# Patient Record
Sex: FEMALE | Race: WHITE | URBAN - METROPOLITAN AREA
[De-identification: names, ages, dates, MRNs, and addresses within clinical notes are randomized per-mention and may not be internally consistent; named-entity substitution may affect disease eponyms.]

---

## 2017-01-01 ENCOUNTER — APPOINTMENT (OUTPATIENT)
Dept: SPEECH THERAPY | Facility: CLINIC | Age: 62
DRG: 065 | End: 2017-01-01
Attending: INTERNAL MEDICINE

## 2017-01-01 ENCOUNTER — APPOINTMENT (OUTPATIENT)
Dept: CT IMAGING | Facility: CLINIC | Age: 62
DRG: 064 | End: 2017-01-01
Attending: EMERGENCY MEDICINE

## 2017-01-01 ENCOUNTER — APPOINTMENT (OUTPATIENT)
Dept: CT IMAGING | Facility: CLINIC | Age: 62
DRG: 065 | End: 2017-01-01
Attending: EMERGENCY MEDICINE

## 2017-01-01 ENCOUNTER — APPOINTMENT (OUTPATIENT)
Dept: INTERVENTIONAL RADIOLOGY/VASCULAR | Facility: CLINIC | Age: 62
DRG: 064 | End: 2017-01-01
Attending: NURSE PRACTITIONER

## 2017-01-01 ENCOUNTER — HOSPITAL ENCOUNTER (INPATIENT)
Facility: CLINIC | Age: 62
LOS: 1 days | Discharge: HOME | DRG: 065 | End: 2017-08-16
Attending: EMERGENCY MEDICINE | Admitting: INTERNAL MEDICINE

## 2017-01-01 ENCOUNTER — ANESTHESIA EVENT (OUTPATIENT)
Dept: INTERVENTIONAL RADIOLOGY/VASCULAR | Facility: CLINIC | Age: 62
DRG: 064 | End: 2017-01-01

## 2017-01-01 ENCOUNTER — APPOINTMENT (OUTPATIENT)
Dept: OCCUPATIONAL THERAPY | Facility: CLINIC | Age: 62
DRG: 065 | End: 2017-01-01
Attending: INTERNAL MEDICINE

## 2017-01-01 ENCOUNTER — APPOINTMENT (OUTPATIENT)
Dept: MRI IMAGING | Facility: CLINIC | Age: 62
DRG: 064 | End: 2017-01-01
Attending: PSYCHIATRY & NEUROLOGY

## 2017-01-01 ENCOUNTER — ANESTHESIA (OUTPATIENT)
Dept: INTERVENTIONAL RADIOLOGY/VASCULAR | Facility: CLINIC | Age: 62
DRG: 064 | End: 2017-01-01

## 2017-01-01 ENCOUNTER — APPOINTMENT (OUTPATIENT)
Dept: GENERAL RADIOLOGY | Facility: CLINIC | Age: 62
DRG: 064 | End: 2017-01-01
Attending: NURSE PRACTITIONER

## 2017-01-01 ENCOUNTER — APPOINTMENT (OUTPATIENT)
Dept: PHYSICAL THERAPY | Facility: CLINIC | Age: 62
DRG: 065 | End: 2017-01-01
Attending: INTERNAL MEDICINE

## 2017-01-01 ENCOUNTER — APPOINTMENT (OUTPATIENT)
Dept: MRI IMAGING | Facility: CLINIC | Age: 62
DRG: 064 | End: 2017-01-01
Attending: NURSE PRACTITIONER

## 2017-01-01 ENCOUNTER — APPOINTMENT (OUTPATIENT)
Dept: CARDIOLOGY | Facility: CLINIC | Age: 62
DRG: 065 | End: 2017-01-01
Attending: INTERNAL MEDICINE

## 2017-01-01 ENCOUNTER — APPOINTMENT (OUTPATIENT)
Dept: MRI IMAGING | Facility: CLINIC | Age: 62
DRG: 065 | End: 2017-01-01
Attending: INTERNAL MEDICINE

## 2017-01-01 ENCOUNTER — HOSPITAL ENCOUNTER (INPATIENT)
Facility: CLINIC | Age: 62
LOS: 4 days | Discharge: HOSPICE | DRG: 064 | End: 2017-09-03
Attending: EMERGENCY MEDICINE | Admitting: INTERNAL MEDICINE

## 2017-01-01 VITALS
HEART RATE: 86 BPM | OXYGEN SATURATION: 94 % | SYSTOLIC BLOOD PRESSURE: 163 MMHG | BODY MASS INDEX: 30.34 KG/M2 | HEIGHT: 64 IN | TEMPERATURE: 98.9 F | RESPIRATION RATE: 16 BRPM | WEIGHT: 177.69 LBS | DIASTOLIC BLOOD PRESSURE: 92 MMHG

## 2017-01-01 VITALS
WEIGHT: 188.49 LBS | HEART RATE: 92 BPM | OXYGEN SATURATION: 100 % | HEIGHT: 64 IN | RESPIRATION RATE: 20 BRPM | BODY MASS INDEX: 32.18 KG/M2 | SYSTOLIC BLOOD PRESSURE: 125 MMHG | TEMPERATURE: 101.5 F | DIASTOLIC BLOOD PRESSURE: 62 MMHG

## 2017-01-01 DIAGNOSIS — I63.219: ICD-10-CM

## 2017-01-01 DIAGNOSIS — I63.22: ICD-10-CM

## 2017-01-01 DIAGNOSIS — E78.2 MIXED HYPERLIPIDEMIA: ICD-10-CM

## 2017-01-01 DIAGNOSIS — I63.9 CEREBROVASCULAR ACCIDENT (CVA), UNSPECIFIED MECHANISM (H): Primary | ICD-10-CM

## 2017-01-01 DIAGNOSIS — Z72.0 TOBACCO ABUSE: ICD-10-CM

## 2017-01-01 DIAGNOSIS — I63.9 CEREBROVASCULAR ACCIDENT (CVA), UNSPECIFIED MECHANISM (H): ICD-10-CM

## 2017-01-01 DIAGNOSIS — Z86.73 HISTORY OF STROKE: ICD-10-CM

## 2017-01-01 LAB
ALBUMIN SERPL-MCNC: 3 G/DL (ref 3.4–5)
ALP SERPL-CCNC: 51 U/L (ref 40–150)
ALT SERPL W P-5'-P-CCNC: 20 U/L (ref 0–50)
ANION GAP SERPL CALCULATED.3IONS-SCNC: 10 MMOL/L (ref 3–14)
ANION GAP SERPL CALCULATED.3IONS-SCNC: 11 MMOL/L (ref 3–14)
ANION GAP SERPL CALCULATED.3IONS-SCNC: 5 MMOL/L (ref 3–14)
ANION GAP SERPL CALCULATED.3IONS-SCNC: 8 MMOL/L (ref 3–14)
APTT PPP: 32 SEC (ref 22–37)
AST SERPL W P-5'-P-CCNC: 10 U/L (ref 0–45)
BASE DEFICIT BLDA-SCNC: 3.5 MMOL/L
BASOPHILS # BLD AUTO: 0 10E9/L (ref 0–0.2)
BASOPHILS # BLD AUTO: 0 10E9/L (ref 0–0.2)
BASOPHILS NFR BLD AUTO: 0.3 %
BASOPHILS NFR BLD AUTO: 0.3 %
BILIRUB DIRECT SERPL-MCNC: 0.2 MG/DL (ref 0–0.2)
BILIRUB SERPL-MCNC: 0.9 MG/DL (ref 0.2–1.3)
BUN SERPL-MCNC: 10 MG/DL (ref 7–30)
BUN SERPL-MCNC: 12 MG/DL (ref 7–30)
BUN SERPL-MCNC: 7 MG/DL (ref 7–30)
BUN SERPL-MCNC: 9 MG/DL (ref 7–30)
CALCIUM SERPL-MCNC: 8.1 MG/DL (ref 8.5–10.1)
CALCIUM SERPL-MCNC: 8.4 MG/DL (ref 8.5–10.1)
CALCIUM SERPL-MCNC: 9.4 MG/DL (ref 8.5–10.1)
CALCIUM SERPL-MCNC: 9.4 MG/DL (ref 8.5–10.1)
CHLORIDE SERPL-SCNC: 103 MMOL/L (ref 94–109)
CHLORIDE SERPL-SCNC: 107 MMOL/L (ref 94–109)
CHLORIDE SERPL-SCNC: 109 MMOL/L (ref 94–109)
CHLORIDE SERPL-SCNC: 111 MMOL/L (ref 94–109)
CHOLEST SERPL-MCNC: 242 MG/DL
CO2 SERPL-SCNC: 23 MMOL/L (ref 20–32)
CO2 SERPL-SCNC: 24 MMOL/L (ref 20–32)
CO2 SERPL-SCNC: 24 MMOL/L (ref 20–32)
CO2 SERPL-SCNC: 27 MMOL/L (ref 20–32)
CREAT SERPL-MCNC: 0.67 MG/DL (ref 0.52–1.04)
CREAT SERPL-MCNC: 0.76 MG/DL (ref 0.52–1.04)
CREAT SERPL-MCNC: 0.78 MG/DL (ref 0.52–1.04)
CREAT SERPL-MCNC: 0.8 MG/DL (ref 0.52–1.04)
DEPRECATED CALCIDIOL+CALCIFEROL SERPL-MC: 17 UG/L (ref 20–75)
DEPRECATED CALCIDIOL+CALCIFEROL SERPL-MC: 21 UG/L (ref 20–75)
DIFFERENTIAL METHOD BLD: ABNORMAL
DIFFERENTIAL METHOD BLD: ABNORMAL
EOSINOPHIL # BLD AUTO: 0.1 10E9/L (ref 0–0.7)
EOSINOPHIL # BLD AUTO: 0.2 10E9/L (ref 0–0.7)
EOSINOPHIL NFR BLD AUTO: 1.1 %
EOSINOPHIL NFR BLD AUTO: 1.4 %
ERYTHROCYTE [DISTWIDTH] IN BLOOD BY AUTOMATED COUNT: 12.7 % (ref 10–15)
ERYTHROCYTE [DISTWIDTH] IN BLOOD BY AUTOMATED COUNT: 12.8 % (ref 10–15)
ERYTHROCYTE [DISTWIDTH] IN BLOOD BY AUTOMATED COUNT: 12.9 % (ref 10–15)
ERYTHROCYTE [DISTWIDTH] IN BLOOD BY AUTOMATED COUNT: 13.7 % (ref 10–15)
ERYTHROCYTE [DISTWIDTH] IN BLOOD BY AUTOMATED COUNT: 13.7 % (ref 10–15)
GFR SERPL CREATININE-BSD FRML MDRD: 73 ML/MIN/1.7M2
GFR SERPL CREATININE-BSD FRML MDRD: 75 ML/MIN/1.7M2
GFR SERPL CREATININE-BSD FRML MDRD: 78 ML/MIN/1.7M2
GFR SERPL CREATININE-BSD FRML MDRD: 89 ML/MIN/1.7M2
GLUCOSE BLDC GLUCOMTR-MCNC: 106 MG/DL (ref 70–99)
GLUCOSE BLDC GLUCOMTR-MCNC: 110 MG/DL (ref 70–99)
GLUCOSE BLDC GLUCOMTR-MCNC: 111 MG/DL (ref 70–99)
GLUCOSE BLDC GLUCOMTR-MCNC: 117 MG/DL (ref 70–99)
GLUCOSE BLDC GLUCOMTR-MCNC: 95 MG/DL (ref 70–99)
GLUCOSE BLDC GLUCOMTR-MCNC: 97 MG/DL (ref 70–99)
GLUCOSE BLDC GLUCOMTR-MCNC: 99 MG/DL (ref 70–99)
GLUCOSE SERPL-MCNC: 104 MG/DL (ref 70–99)
GLUCOSE SERPL-MCNC: 106 MG/DL (ref 70–99)
GLUCOSE SERPL-MCNC: 115 MG/DL (ref 70–99)
GLUCOSE SERPL-MCNC: 95 MG/DL (ref 70–99)
HBA1C MFR BLD: 5.2 % (ref 4.3–6)
HCO3 BLD-SCNC: 21 MMOL/L (ref 21–28)
HCT VFR BLD AUTO: 33.9 % (ref 35–47)
HCT VFR BLD AUTO: 39.2 % (ref 35–47)
HCT VFR BLD AUTO: 42.7 % (ref 35–47)
HCT VFR BLD AUTO: 45.1 % (ref 35–47)
HCT VFR BLD AUTO: 46.2 % (ref 35–47)
HDLC SERPL-MCNC: 78 MG/DL
HGB BLD-MCNC: 11.4 G/DL (ref 11.7–15.7)
HGB BLD-MCNC: 13.5 G/DL (ref 11.7–15.7)
HGB BLD-MCNC: 14.4 G/DL (ref 11.7–15.7)
HGB BLD-MCNC: 15.3 G/DL (ref 11.7–15.7)
HGB BLD-MCNC: 16.1 G/DL (ref 11.7–15.7)
IMM GRANULOCYTES # BLD: 0 10E9/L (ref 0–0.4)
IMM GRANULOCYTES # BLD: 0 10E9/L (ref 0–0.4)
IMM GRANULOCYTES NFR BLD: 0.2 %
IMM GRANULOCYTES NFR BLD: 0.3 %
INR PPP: 0.92 (ref 0.86–1.14)
INR PPP: 0.96 (ref 0.86–1.14)
INTERPRETATION ECG - MUSE: NORMAL
INTERPRETATION ECG - MUSE: NORMAL
LDLC SERPL CALC-MCNC: 127 MG/DL
LMWH PPP CHRO-ACNC: 0.13 IU/ML
LMWH PPP CHRO-ACNC: 0.26 IU/ML
LMWH PPP CHRO-ACNC: 0.38 IU/ML
LMWH PPP CHRO-ACNC: 0.4 IU/ML
LMWH PPP CHRO-ACNC: <0.1 IU/ML
LYMPHOCYTES # BLD AUTO: 1.8 10E9/L (ref 0.8–5.3)
LYMPHOCYTES # BLD AUTO: 3.2 10E9/L (ref 0.8–5.3)
LYMPHOCYTES NFR BLD AUTO: 15 %
LYMPHOCYTES NFR BLD AUTO: 30.7 %
MAGNESIUM SERPL-MCNC: 1.8 MG/DL (ref 1.6–2.3)
MCH RBC QN AUTO: 33.6 PG (ref 26.5–33)
MCH RBC QN AUTO: 34 PG (ref 26.5–33)
MCH RBC QN AUTO: 34.2 PG (ref 26.5–33)
MCH RBC QN AUTO: 34.5 PG (ref 26.5–33)
MCH RBC QN AUTO: 34.9 PG (ref 26.5–33)
MCHC RBC AUTO-ENTMCNC: 33.6 G/DL (ref 31.5–36.5)
MCHC RBC AUTO-ENTMCNC: 33.7 G/DL (ref 31.5–36.5)
MCHC RBC AUTO-ENTMCNC: 33.9 G/DL (ref 31.5–36.5)
MCHC RBC AUTO-ENTMCNC: 34.4 G/DL (ref 31.5–36.5)
MCHC RBC AUTO-ENTMCNC: 34.8 G/DL (ref 31.5–36.5)
MCV RBC AUTO: 100 FL (ref 78–100)
MCV RBC AUTO: 101 FL (ref 78–100)
MCV RBC AUTO: 101 FL (ref 78–100)
MONOCYTES # BLD AUTO: 0.8 10E9/L (ref 0–1.3)
MONOCYTES # BLD AUTO: 0.9 10E9/L (ref 0–1.3)
MONOCYTES NFR BLD AUTO: 7.5 %
MONOCYTES NFR BLD AUTO: 7.8 %
MRSA DNA SPEC QL NAA+PROBE: NEGATIVE
NEUTROPHILS # BLD AUTO: 6.3 10E9/L (ref 1.6–8.3)
NEUTROPHILS # BLD AUTO: 8.9 10E9/L (ref 1.6–8.3)
NEUTROPHILS NFR BLD AUTO: 59.8 %
NEUTROPHILS NFR BLD AUTO: 75.6 %
NONHDLC SERPL-MCNC: 164 MG/DL
NRBC # BLD AUTO: 0 10*3/UL
NRBC # BLD AUTO: 0 10*3/UL
NRBC BLD AUTO-RTO: 0 /100
NRBC BLD AUTO-RTO: 0 /100
O2/TOTAL GAS SETTING VFR VENT: ABNORMAL %
OXYHGB MFR BLD: 99 % (ref 92–100)
PCO2 BLD: 37 MM HG (ref 35–45)
PH BLD: 7.37 PH (ref 7.35–7.45)
PLATELET # BLD AUTO: 229 10E9/L (ref 150–450)
PLATELET # BLD AUTO: 254 10E9/L (ref 150–450)
PLATELET # BLD AUTO: 271 10E9/L (ref 150–450)
PLATELET # BLD AUTO: 298 10E9/L (ref 150–450)
PLATELET # BLD AUTO: 320 10E9/L (ref 150–450)
PO2 BLD: 156 MM HG (ref 80–105)
POTASSIUM SERPL-SCNC: 3.6 MMOL/L (ref 3.4–5.3)
POTASSIUM SERPL-SCNC: 3.8 MMOL/L (ref 3.4–5.3)
POTASSIUM SERPL-SCNC: 4 MMOL/L (ref 3.4–5.3)
POTASSIUM SERPL-SCNC: 4.1 MMOL/L (ref 3.4–5.3)
PROT SERPL-MCNC: 6.1 G/DL (ref 6.8–8.8)
RBC # BLD AUTO: 3.39 10E12/L (ref 3.8–5.2)
RBC # BLD AUTO: 3.91 10E12/L (ref 3.8–5.2)
RBC # BLD AUTO: 4.24 10E12/L (ref 3.8–5.2)
RBC # BLD AUTO: 4.48 10E12/L (ref 3.8–5.2)
RBC # BLD AUTO: 4.61 10E12/L (ref 3.8–5.2)
SODIUM SERPL-SCNC: 138 MMOL/L (ref 133–144)
SODIUM SERPL-SCNC: 139 MMOL/L (ref 133–144)
SODIUM SERPL-SCNC: 142 MMOL/L (ref 133–144)
SODIUM SERPL-SCNC: 143 MMOL/L (ref 133–144)
SPECIMEN SOURCE: NORMAL
TRIGL SERPL-MCNC: 186 MG/DL
TROPONIN I SERPL-MCNC: 0.04 UG/L (ref 0–0.04)
TROPONIN I SERPL-MCNC: 0.07 UG/L (ref 0–0.04)
TROPONIN I SERPL-MCNC: <0.015 UG/L (ref 0–0.04)
TSH SERPL DL<=0.005 MIU/L-ACNC: 1.69 MU/L (ref 0.4–4)
WBC # BLD AUTO: 10.5 10E9/L (ref 4–11)
WBC # BLD AUTO: 11.3 10E9/L (ref 4–11)
WBC # BLD AUTO: 11.8 10E9/L (ref 4–11)
WBC # BLD AUTO: 17 10E9/L (ref 4–11)
WBC # BLD AUTO: 8.6 10E9/L (ref 4–11)

## 2017-01-01 PROCEDURE — 27210738 ZZH ACCESSORY CR2

## 2017-01-01 PROCEDURE — 94003 VENT MGMT INPAT SUBQ DAY: CPT

## 2017-01-01 PROCEDURE — 70498 CT ANGIOGRAPHY NECK: CPT

## 2017-01-01 PROCEDURE — 25000128 H RX IP 250 OP 636: Performed by: HOSPITALIST

## 2017-01-01 PROCEDURE — 85730 THROMBOPLASTIN TIME PARTIAL: CPT | Performed by: EMERGENCY MEDICINE

## 2017-01-01 PROCEDURE — 27211141 ZZHC CATH NEURO CR18

## 2017-01-01 PROCEDURE — 40000008 ZZH STATISTIC AIRWAY CARE

## 2017-01-01 PROCEDURE — C1769 GUIDE WIRE: HCPCS

## 2017-01-01 PROCEDURE — 40000133 ZZH STATISTIC OT WARD VISIT

## 2017-01-01 PROCEDURE — 25000132 ZZH RX MED GY IP 250 OP 250 PS 637: Performed by: NURSE PRACTITIONER

## 2017-01-01 PROCEDURE — 40000986 XR CHEST PORT 1 VW

## 2017-01-01 PROCEDURE — 99223 1ST HOSP IP/OBS HIGH 75: CPT | Mod: AI | Performed by: PHYSICIAN ASSISTANT

## 2017-01-01 PROCEDURE — 36415 COLL VENOUS BLD VENIPUNCTURE: CPT | Performed by: PSYCHIATRY & NEUROLOGY

## 2017-01-01 PROCEDURE — 36226 PLACE CATH VERTEBRAL ART: CPT

## 2017-01-01 PROCEDURE — 80048 BASIC METABOLIC PNL TOTAL CA: CPT | Performed by: EMERGENCY MEDICINE

## 2017-01-01 PROCEDURE — C1887 CATHETER, GUIDING: HCPCS

## 2017-01-01 PROCEDURE — 36415 COLL VENOUS BLD VENIPUNCTURE: CPT | Performed by: PHYSICIAN ASSISTANT

## 2017-01-01 PROCEDURE — 40000193 ZZH STATISTIC PT WARD VISIT: Performed by: PHYSICAL THERAPIST

## 2017-01-01 PROCEDURE — 70551 MRI BRAIN STEM W/O DYE: CPT

## 2017-01-01 PROCEDURE — 25000128 H RX IP 250 OP 636: Performed by: EMERGENCY MEDICINE

## 2017-01-01 PROCEDURE — 36415 COLL VENOUS BLD VENIPUNCTURE: CPT | Performed by: NURSE PRACTITIONER

## 2017-01-01 PROCEDURE — 25000132 ZZH RX MED GY IP 250 OP 250 PS 637: Performed by: PHYSICIAN ASSISTANT

## 2017-01-01 PROCEDURE — 82306 VITAMIN D 25 HYDROXY: CPT | Performed by: PHYSICIAN ASSISTANT

## 2017-01-01 PROCEDURE — 36415 COLL VENOUS BLD VENIPUNCTURE: CPT | Performed by: INTERNAL MEDICINE

## 2017-01-01 PROCEDURE — 84484 ASSAY OF TROPONIN QUANT: CPT | Performed by: INTERNAL MEDICINE

## 2017-01-01 PROCEDURE — 40000264 ECHO COMPLETE BUBBLE STUDY WITH OPTISON

## 2017-01-01 PROCEDURE — 25000132 ZZH RX MED GY IP 250 OP 250 PS 637: Performed by: INTERNAL MEDICINE

## 2017-01-01 PROCEDURE — 25000132 ZZH RX MED GY IP 250 OP 250 PS 637: Performed by: HOSPITALIST

## 2017-01-01 PROCEDURE — 85025 COMPLETE CBC W/AUTO DIFF WBC: CPT | Performed by: EMERGENCY MEDICINE

## 2017-01-01 PROCEDURE — 25000125 ZZHC RX 250: Performed by: NURSE PRACTITIONER

## 2017-01-01 PROCEDURE — S0028 INJECTION, FAMOTIDINE, 20 MG: HCPCS | Performed by: NURSE PRACTITIONER

## 2017-01-01 PROCEDURE — B3151ZZ FLUOROSCOPY OF BILATERAL COMMON CAROTID ARTERIES USING LOW OSMOLAR CONTRAST: ICD-10-PCS | Performed by: RADIOLOGY

## 2017-01-01 PROCEDURE — 27211192 ZZ H SHEATH CR14

## 2017-01-01 PROCEDURE — 92526 ORAL FUNCTION THERAPY: CPT | Mod: GN | Performed by: SPEECH-LANGUAGE PATHOLOGIST

## 2017-01-01 PROCEDURE — 20000003 ZZH R&B ICU

## 2017-01-01 PROCEDURE — 25000128 H RX IP 250 OP 636: Performed by: NURSE PRACTITIONER

## 2017-01-01 PROCEDURE — 85520 HEPARIN ASSAY: CPT | Performed by: PSYCHIATRY & NEUROLOGY

## 2017-01-01 PROCEDURE — 25000128 H RX IP 250 OP 636: Performed by: NURSE ANESTHETIST, CERTIFIED REGISTERED

## 2017-01-01 PROCEDURE — 25000128 H RX IP 250 OP 636: Performed by: INTERNAL MEDICINE

## 2017-01-01 PROCEDURE — 99239 HOSP IP/OBS DSCHRG MGMT >30: CPT | Performed by: HOSPITALIST

## 2017-01-01 PROCEDURE — 25000128 H RX IP 250 OP 636: Performed by: PHYSICIAN ASSISTANT

## 2017-01-01 PROCEDURE — 83735 ASSAY OF MAGNESIUM: CPT | Performed by: INTERNAL MEDICINE

## 2017-01-01 PROCEDURE — 27210737 ZZH ACCESSORY CR14

## 2017-01-01 PROCEDURE — 00000146 ZZHCL STATISTIC GLUCOSE BY METER IP

## 2017-01-01 PROCEDURE — 85610 PROTHROMBIN TIME: CPT | Performed by: EMERGENCY MEDICINE

## 2017-01-01 PROCEDURE — 25000128 H RX IP 250 OP 636

## 2017-01-01 PROCEDURE — 99232 SBSQ HOSP IP/OBS MODERATE 35: CPT | Performed by: HOSPITALIST

## 2017-01-01 PROCEDURE — 80048 BASIC METABOLIC PNL TOTAL CA: CPT | Performed by: INTERNAL MEDICINE

## 2017-01-01 PROCEDURE — 80061 LIPID PANEL: CPT | Performed by: INTERNAL MEDICINE

## 2017-01-01 PROCEDURE — 40000275 ZZH STATISTIC RCP TIME EA 10 MIN

## 2017-01-01 PROCEDURE — 27210732 ZZH ACCESSORY CR1

## 2017-01-01 PROCEDURE — 85027 COMPLETE CBC AUTOMATED: CPT | Performed by: PHYSICIAN ASSISTANT

## 2017-01-01 PROCEDURE — 25000128 H RX IP 250 OP 636: Performed by: PSYCHIATRY & NEUROLOGY

## 2017-01-01 PROCEDURE — 70470 CT HEAD/BRAIN W/O & W/DYE: CPT | Mod: XS

## 2017-01-01 PROCEDURE — 25000125 ZZHC RX 250: Performed by: EMERGENCY MEDICINE

## 2017-01-01 PROCEDURE — 80076 HEPATIC FUNCTION PANEL: CPT | Performed by: INTERNAL MEDICINE

## 2017-01-01 PROCEDURE — 12000000 ZZH R&B MED SURG/OB

## 2017-01-01 PROCEDURE — 82805 BLOOD GASES W/O2 SATURATION: CPT | Performed by: NURSE PRACTITIONER

## 2017-01-01 PROCEDURE — 85027 COMPLETE CBC AUTOMATED: CPT | Performed by: INTERNAL MEDICINE

## 2017-01-01 PROCEDURE — 99406 BEHAV CHNG SMOKING 3-10 MIN: CPT

## 2017-01-01 PROCEDURE — 70460 CT HEAD/BRAIN W/DYE: CPT

## 2017-01-01 PROCEDURE — 93306 TTE W/DOPPLER COMPLETE: CPT | Mod: 26 | Performed by: INTERNAL MEDICINE

## 2017-01-01 PROCEDURE — 97161 PT EVAL LOW COMPLEX 20 MIN: CPT | Mod: GP | Performed by: PHYSICAL THERAPIST

## 2017-01-01 PROCEDURE — 40000281 ZZH STATISTIC TRANSPORT TIME EA 15 MIN

## 2017-01-01 PROCEDURE — 36222 PLACE CATH CAROTID/INOM ART: CPT | Mod: 50

## 2017-01-01 PROCEDURE — 85520 HEPARIN ASSAY: CPT | Performed by: NURSE PRACTITIONER

## 2017-01-01 PROCEDURE — 70551 MRI BRAIN STEM W/O DYE: CPT | Mod: 77

## 2017-01-01 PROCEDURE — 93005 ELECTROCARDIOGRAM TRACING: CPT

## 2017-01-01 PROCEDURE — 94002 VENT MGMT INPAT INIT DAY: CPT

## 2017-01-01 PROCEDURE — 84484 ASSAY OF TROPONIN QUANT: CPT | Performed by: PHYSICIAN ASSISTANT

## 2017-01-01 PROCEDURE — 97165 OT EVAL LOW COMPLEX 30 MIN: CPT | Mod: GO

## 2017-01-01 PROCEDURE — 27210906 ZZH KIT CR8

## 2017-01-01 PROCEDURE — 25000128 H RX IP 250 OP 636: Performed by: RADIOLOGY

## 2017-01-01 PROCEDURE — 92610 EVALUATE SWALLOWING FUNCTION: CPT | Mod: GN | Performed by: SPEECH-LANGUAGE PATHOLOGIST

## 2017-01-01 PROCEDURE — 97116 GAIT TRAINING THERAPY: CPT | Mod: GP | Performed by: PHYSICAL THERAPIST

## 2017-01-01 PROCEDURE — 83036 HEMOGLOBIN GLYCOSYLATED A1C: CPT | Performed by: INTERNAL MEDICINE

## 2017-01-01 PROCEDURE — 27210886 ZZH ACCESSORY CR5

## 2017-01-01 PROCEDURE — 96360 HYDRATION IV INFUSION INIT: CPT | Mod: 59

## 2017-01-01 PROCEDURE — 84443 ASSAY THYROID STIM HORMONE: CPT | Performed by: INTERNAL MEDICINE

## 2017-01-01 PROCEDURE — 70450 CT HEAD/BRAIN W/O DYE: CPT

## 2017-01-01 PROCEDURE — 87641 MR-STAPH DNA AMP PROBE: CPT | Performed by: NURSE PRACTITIONER

## 2017-01-01 PROCEDURE — 36600 WITHDRAWAL OF ARTERIAL BLOOD: CPT

## 2017-01-01 PROCEDURE — 25500064 ZZH RX 255 OP 636: Performed by: INTERNAL MEDICINE

## 2017-01-01 PROCEDURE — 85520 HEPARIN ASSAY: CPT | Performed by: INTERNAL MEDICINE

## 2017-01-01 PROCEDURE — 99222 1ST HOSP IP/OBS MODERATE 55: CPT | Mod: AI | Performed by: INTERNAL MEDICINE

## 2017-01-01 PROCEDURE — B31F1ZZ FLUOROSCOPY OF LEFT VERTEBRAL ARTERY USING LOW OSMOLAR CONTRAST: ICD-10-PCS | Performed by: RADIOLOGY

## 2017-01-01 PROCEDURE — 99285 EMERGENCY DEPT VISIT HI MDM: CPT | Mod: 25

## 2017-01-01 PROCEDURE — 40000225 ZZH STATISTIC SLP WARD VISIT: Performed by: SPEECH-LANGUAGE PATHOLOGIST

## 2017-01-01 PROCEDURE — 5A1945Z RESPIRATORY VENTILATION, 24-96 CONSECUTIVE HOURS: ICD-10-PCS | Performed by: HOSPITALIST

## 2017-01-01 PROCEDURE — 82306 VITAMIN D 25 HYDROXY: CPT | Performed by: INTERNAL MEDICINE

## 2017-01-01 PROCEDURE — 87640 STAPH A DNA AMP PROBE: CPT | Performed by: NURSE PRACTITIONER

## 2017-01-01 PROCEDURE — 27210893 ZZH CATH CR5

## 2017-01-01 PROCEDURE — 25000132 ZZH RX MED GY IP 250 OP 250 PS 637: Performed by: PSYCHIATRY & NEUROLOGY

## 2017-01-01 RX ORDER — NALOXONE HYDROCHLORIDE 0.4 MG/ML
.1-.4 INJECTION, SOLUTION INTRAMUSCULAR; INTRAVENOUS; SUBCUTANEOUS
Status: DISCONTINUED | OUTPATIENT
Start: 2017-01-01 | End: 2017-01-01

## 2017-01-01 RX ORDER — HEPARIN SODIUM 1000 [USP'U]/ML
INJECTION, SOLUTION INTRAVENOUS; SUBCUTANEOUS
Status: DISCONTINUED
Start: 2017-01-01 | End: 2017-01-01 | Stop reason: HOSPADM

## 2017-01-01 RX ORDER — ONDANSETRON 2 MG/ML
4 INJECTION INTRAMUSCULAR; INTRAVENOUS EVERY 6 HOURS PRN
Status: DISCONTINUED | OUTPATIENT
Start: 2017-01-01 | End: 2017-01-01

## 2017-01-01 RX ORDER — MORPHINE SULFATE 4 MG/ML
5-10 INJECTION, SOLUTION INTRAMUSCULAR; INTRAVENOUS EVERY 30 MIN PRN
Status: DISCONTINUED | OUTPATIENT
Start: 2017-01-01 | End: 2017-01-01

## 2017-01-01 RX ORDER — ACETAMINOPHEN 10 MG/ML
1000 INJECTION, SOLUTION INTRAVENOUS ONCE
Status: COMPLETED | OUTPATIENT
Start: 2017-01-01 | End: 2017-01-01

## 2017-01-01 RX ORDER — ONDANSETRON 2 MG/ML
4 INJECTION INTRAMUSCULAR; INTRAVENOUS EVERY 6 HOURS PRN
Status: DISCONTINUED | OUTPATIENT
Start: 2017-01-01 | End: 2017-01-01 | Stop reason: HOSPADM

## 2017-01-01 RX ORDER — MORPHINE SULFATE 20 MG/ML
5-10 SOLUTION ORAL
Status: DISCONTINUED | OUTPATIENT
Start: 2017-01-01 | End: 2017-01-01 | Stop reason: HOSPADM

## 2017-01-01 RX ORDER — ACETAMINOPHEN 650 MG/1
650 SUPPOSITORY RECTAL EVERY 4 HOURS PRN
Status: DISCONTINUED | OUTPATIENT
Start: 2017-01-01 | End: 2017-01-01 | Stop reason: HOSPADM

## 2017-01-01 RX ORDER — UBIDECARENONE 100 MG
100 CAPSULE ORAL DAILY
Status: DISCONTINUED | OUTPATIENT
Start: 2017-01-01 | End: 2017-01-01

## 2017-01-01 RX ORDER — ONDANSETRON 4 MG/1
4 TABLET, ORALLY DISINTEGRATING ORAL EVERY 6 HOURS PRN
Status: DISCONTINUED | OUTPATIENT
Start: 2017-01-01 | End: 2017-01-01

## 2017-01-01 RX ORDER — AMOXICILLIN 250 MG
1-2 CAPSULE ORAL 2 TIMES DAILY PRN
Status: DISCONTINUED | OUTPATIENT
Start: 2017-01-01 | End: 2017-01-01

## 2017-01-01 RX ORDER — BISACODYL 10 MG
10 SUPPOSITORY, RECTAL RECTAL DAILY PRN
Status: DISCONTINUED | OUTPATIENT
Start: 2017-01-01 | End: 2017-01-01

## 2017-01-01 RX ORDER — FENTANYL CITRATE 50 UG/ML
25-50 INJECTION, SOLUTION INTRAMUSCULAR; INTRAVENOUS EVERY 5 MIN PRN
Status: DISCONTINUED | OUTPATIENT
Start: 2017-01-01 | End: 2017-01-01 | Stop reason: HOSPADM

## 2017-01-01 RX ORDER — BISACODYL 10 MG
SUPPOSITORY, RECTAL RECTAL
Qty: 12 SUPPOSITORY | Refills: 1 | Status: SHIPPED | OUTPATIENT
Start: 2017-01-01

## 2017-01-01 RX ORDER — NALOXONE HYDROCHLORIDE 0.4 MG/ML
.1-.4 INJECTION, SOLUTION INTRAMUSCULAR; INTRAVENOUS; SUBCUTANEOUS
Status: DISCONTINUED | OUTPATIENT
Start: 2017-01-01 | End: 2017-01-01 | Stop reason: HOSPADM

## 2017-01-01 RX ORDER — SIMVASTATIN 20 MG
20 TABLET ORAL AT BEDTIME
Qty: 30 TABLET | Refills: 0 | Status: ON HOLD | OUTPATIENT
Start: 2017-01-01 | End: 2017-01-01

## 2017-01-01 RX ORDER — FENTANYL CITRATE 50 UG/ML
INJECTION, SOLUTION INTRAMUSCULAR; INTRAVENOUS
Status: COMPLETED
Start: 2017-01-01 | End: 2017-01-01

## 2017-01-01 RX ORDER — LORAZEPAM 2 MG/ML
.5-1 CONCENTRATE ORAL EVERY 4 HOURS PRN
Qty: 30 ML | Refills: 1 | Status: SHIPPED | OUTPATIENT
Start: 2017-01-01

## 2017-01-01 RX ORDER — LISINOPRIL 2.5 MG/1
2.5 TABLET ORAL DAILY
Status: DISCONTINUED | OUTPATIENT
Start: 2017-01-01 | End: 2017-01-01

## 2017-01-01 RX ORDER — MORPHINE SULFATE 10 MG/5ML
5-10 SOLUTION ORAL
Status: DISCONTINUED | OUTPATIENT
Start: 2017-01-01 | End: 2017-01-01 | Stop reason: HOSPADM

## 2017-01-01 RX ORDER — HYDROMORPHONE HYDROCHLORIDE 1 MG/ML
.3-.5 INJECTION, SOLUTION INTRAMUSCULAR; INTRAVENOUS; SUBCUTANEOUS
Status: DISCONTINUED | OUTPATIENT
Start: 2017-01-01 | End: 2017-01-01

## 2017-01-01 RX ORDER — CHLORHEXIDINE GLUCONATE ORAL RINSE 1.2 MG/ML
15 SOLUTION DENTAL EVERY 12 HOURS
Status: DISCONTINUED | OUTPATIENT
Start: 2017-01-01 | End: 2017-01-01

## 2017-01-01 RX ORDER — LABETALOL HYDROCHLORIDE 5 MG/ML
10-40 INJECTION, SOLUTION INTRAVENOUS EVERY 10 MIN PRN
Status: DISCONTINUED | OUTPATIENT
Start: 2017-01-01 | End: 2017-01-01 | Stop reason: HOSPADM

## 2017-01-01 RX ORDER — LIDOCAINE HYDROCHLORIDE 10 MG/ML
INJECTION, SOLUTION INFILTRATION; PERINEURAL
Status: DISCONTINUED
Start: 2017-01-01 | End: 2017-01-01 | Stop reason: HOSPADM

## 2017-01-01 RX ORDER — PROPOFOL 10 MG/ML
INJECTION, EMULSION INTRAVENOUS PRN
Status: DISCONTINUED | OUTPATIENT
Start: 2017-01-01 | End: 2017-01-01

## 2017-01-01 RX ORDER — SODIUM CHLORIDE 9 MG/ML
INJECTION, SOLUTION INTRAVENOUS CONTINUOUS
Status: DISCONTINUED | OUTPATIENT
Start: 2017-01-01 | End: 2017-01-01 | Stop reason: HOSPADM

## 2017-01-01 RX ORDER — POLYETHYLENE GLYCOL 3350 17 G/17G
17 POWDER, FOR SOLUTION ORAL DAILY PRN
Status: DISCONTINUED | OUTPATIENT
Start: 2017-01-01 | End: 2017-01-01

## 2017-01-01 RX ORDER — FLUMAZENIL 0.1 MG/ML
0.2 INJECTION, SOLUTION INTRAVENOUS
Status: DISCONTINUED | OUTPATIENT
Start: 2017-01-01 | End: 2017-01-01 | Stop reason: HOSPADM

## 2017-01-01 RX ORDER — PROPOFOL 10 MG/ML
5-75 INJECTION, EMULSION INTRAVENOUS CONTINUOUS
Status: DISCONTINUED | OUTPATIENT
Start: 2017-01-01 | End: 2017-01-01

## 2017-01-01 RX ORDER — PROCHLORPERAZINE MALEATE 5 MG
5-10 TABLET ORAL EVERY 6 HOURS PRN
Status: DISCONTINUED | OUTPATIENT
Start: 2017-01-01 | End: 2017-01-01

## 2017-01-01 RX ORDER — HYDROMORPHONE HYDROCHLORIDE 1 MG/ML
INJECTION, SOLUTION INTRAMUSCULAR; INTRAVENOUS; SUBCUTANEOUS
Status: COMPLETED
Start: 2017-01-01 | End: 2017-01-01

## 2017-01-01 RX ORDER — SODIUM CHLORIDE 9 MG/ML
INJECTION, SOLUTION INTRAVENOUS CONTINUOUS
Status: DISCONTINUED | OUTPATIENT
Start: 2017-01-01 | End: 2017-01-01

## 2017-01-01 RX ORDER — GLYCOPYRROLATE 0.2 MG/ML
.2-.4 INJECTION, SOLUTION INTRAMUSCULAR; INTRAVENOUS EVERY 4 HOURS PRN
Status: DISCONTINUED | OUTPATIENT
Start: 2017-01-01 | End: 2017-01-01 | Stop reason: HOSPADM

## 2017-01-01 RX ORDER — MORPHINE SULFATE 2 MG/ML
1-2 INJECTION, SOLUTION INTRAMUSCULAR; INTRAVENOUS
Status: DISCONTINUED | OUTPATIENT
Start: 2017-01-01 | End: 2017-01-01 | Stop reason: HOSPADM

## 2017-01-01 RX ORDER — LORAZEPAM 2 MG/ML
1 INJECTION INTRAMUSCULAR ONCE
Status: DISCONTINUED | OUTPATIENT
Start: 2017-01-01 | End: 2017-01-01

## 2017-01-01 RX ORDER — ACETAMINOPHEN 325 MG/1
650 TABLET ORAL EVERY 4 HOURS PRN
Status: DISCONTINUED | OUTPATIENT
Start: 2017-01-01 | End: 2017-01-01 | Stop reason: HOSPADM

## 2017-01-01 RX ORDER — FENTANYL CITRATE 50 UG/ML
INJECTION, SOLUTION INTRAMUSCULAR; INTRAVENOUS
Status: DISCONTINUED
Start: 2017-01-01 | End: 2017-01-01 | Stop reason: HOSPADM

## 2017-01-01 RX ORDER — HYDRALAZINE HYDROCHLORIDE 20 MG/ML
INJECTION INTRAMUSCULAR; INTRAVENOUS
Status: COMPLETED
Start: 2017-01-01 | End: 2017-01-01

## 2017-01-01 RX ORDER — ACETAMINOPHEN 325 MG/1
650 TABLET ORAL EVERY 4 HOURS PRN
Status: DISCONTINUED | OUTPATIENT
Start: 2017-01-01 | End: 2017-01-01

## 2017-01-01 RX ORDER — ATROPINE SULFATE 10 MG/ML
2-4 SOLUTION/ DROPS OPHTHALMIC
Qty: 5 ML | Refills: 1 | Status: SHIPPED | OUTPATIENT
Start: 2017-01-01

## 2017-01-01 RX ORDER — LISINOPRIL 2.5 MG/1
2.5 TABLET ORAL DAILY
Qty: 30 TABLET | Refills: 0 | Status: ON HOLD | OUTPATIENT
Start: 2017-01-01 | End: 2017-01-01

## 2017-01-01 RX ORDER — ASPIRIN 81 MG/1
81 TABLET ORAL DAILY
Status: DISCONTINUED | OUTPATIENT
Start: 2017-01-01 | End: 2017-01-01

## 2017-01-01 RX ORDER — GLYCOPYRROLATE 1 MG/1
2 TABLET ORAL EVERY 4 HOURS PRN
Status: DISCONTINUED | OUTPATIENT
Start: 2017-01-01 | End: 2017-01-01 | Stop reason: HOSPADM

## 2017-01-01 RX ORDER — ACETAMINOPHEN 650 MG/1
650 SUPPOSITORY RECTAL EVERY 4 HOURS PRN
Status: DISCONTINUED | OUTPATIENT
Start: 2017-01-01 | End: 2017-01-01

## 2017-01-01 RX ORDER — LIDOCAINE HYDROCHLORIDE 10 MG/ML
1-30 INJECTION, SOLUTION EPIDURAL; INFILTRATION; INTRACAUDAL; PERINEURAL
Status: DISCONTINUED | OUTPATIENT
Start: 2017-01-01 | End: 2017-01-01 | Stop reason: HOSPADM

## 2017-01-01 RX ORDER — IOPAMIDOL 755 MG/ML
120 INJECTION, SOLUTION INTRAVASCULAR ONCE
Status: COMPLETED | OUTPATIENT
Start: 2017-01-01 | End: 2017-01-01

## 2017-01-01 RX ORDER — MULTIPLE VITAMINS W/ MINERALS TAB 9MG-400MCG
1 TAB ORAL DAILY
Status: ON HOLD | COMMUNITY
End: 2017-01-01

## 2017-01-01 RX ORDER — LABETALOL HYDROCHLORIDE 5 MG/ML
10-40 INJECTION, SOLUTION INTRAVENOUS EVERY 10 MIN PRN
Status: DISCONTINUED | OUTPATIENT
Start: 2017-01-01 | End: 2017-01-01

## 2017-01-01 RX ORDER — LORAZEPAM 0.5 MG/1
0.5 TABLET ORAL EVERY 8 HOURS PRN
Status: DISCONTINUED | OUTPATIENT
Start: 2017-01-01 | End: 2017-01-01 | Stop reason: HOSPADM

## 2017-01-01 RX ORDER — LIDOCAINE 40 MG/G
CREAM TOPICAL
Status: DISCONTINUED | OUTPATIENT
Start: 2017-01-01 | End: 2017-01-01 | Stop reason: HOSPADM

## 2017-01-01 RX ORDER — PROCHLORPERAZINE 25 MG
25 SUPPOSITORY, RECTAL RECTAL EVERY 12 HOURS PRN
Status: DISCONTINUED | OUTPATIENT
Start: 2017-01-01 | End: 2017-01-01

## 2017-01-01 RX ORDER — HYDRALAZINE HYDROCHLORIDE 20 MG/ML
10-20 INJECTION INTRAMUSCULAR; INTRAVENOUS
Status: DISCONTINUED | OUTPATIENT
Start: 2017-01-01 | End: 2017-01-01

## 2017-01-01 RX ORDER — AMOXICILLIN 250 MG
1-2 CAPSULE ORAL 2 TIMES DAILY PRN
Status: DISCONTINUED | OUTPATIENT
Start: 2017-01-01 | End: 2017-01-01 | Stop reason: HOSPADM

## 2017-01-01 RX ORDER — IOPAMIDOL 612 MG/ML
150 INJECTION, SOLUTION INTRAVASCULAR ONCE
Status: COMPLETED | OUTPATIENT
Start: 2017-01-01 | End: 2017-01-01

## 2017-01-01 RX ORDER — ONDANSETRON 4 MG/1
4 TABLET, ORALLY DISINTEGRATING ORAL EVERY 6 HOURS PRN
Status: DISCONTINUED | OUTPATIENT
Start: 2017-01-01 | End: 2017-01-01 | Stop reason: HOSPADM

## 2017-01-01 RX ORDER — MORPHINE SULFATE 4 MG/ML
5-10 INJECTION, SOLUTION INTRAMUSCULAR; INTRAVENOUS EVERY 10 MIN PRN
Status: DISCONTINUED | OUTPATIENT
Start: 2017-01-01 | End: 2017-01-01

## 2017-01-01 RX ORDER — SIMVASTATIN 20 MG
20 TABLET ORAL AT BEDTIME
Status: DISCONTINUED | OUTPATIENT
Start: 2017-01-01 | End: 2017-01-01

## 2017-01-01 RX ORDER — IOPAMIDOL 755 MG/ML
70 INJECTION, SOLUTION INTRAVASCULAR ONCE
Status: COMPLETED | OUTPATIENT
Start: 2017-01-01 | End: 2017-01-01

## 2017-01-01 RX ORDER — MORPHINE SULFATE 30 MG/1
2.5 TABLET ORAL
Qty: 30 TABLET | Refills: 0 | Status: SHIPPED | OUTPATIENT
Start: 2017-01-01

## 2017-01-01 RX ORDER — HYDRALAZINE HYDROCHLORIDE 20 MG/ML
10-20 INJECTION INTRAMUSCULAR; INTRAVENOUS
Status: DISCONTINUED | OUTPATIENT
Start: 2017-01-01 | End: 2017-01-01 | Stop reason: HOSPADM

## 2017-01-01 RX ORDER — ASPIRIN 81 MG/1
81 TABLET ORAL DAILY
Status: DISCONTINUED | OUTPATIENT
Start: 2017-01-01 | End: 2017-01-01 | Stop reason: HOSPADM

## 2017-01-01 RX ORDER — CEFTRIAXONE 1 G/1
1 INJECTION, POWDER, FOR SOLUTION INTRAMUSCULAR; INTRAVENOUS EVERY 24 HOURS
Status: DISCONTINUED | OUTPATIENT
Start: 2017-01-01 | End: 2017-01-01

## 2017-01-01 RX ORDER — HALOPERIDOL 2 MG/ML
1-2 SOLUTION ORAL EVERY 6 HOURS PRN
Qty: 30 ML | Refills: 1 | Status: SHIPPED | OUTPATIENT
Start: 2017-01-01

## 2017-01-01 RX ADMIN — SODIUM CHLORIDE 100 ML: 9 INJECTION, SOLUTION INTRAVENOUS at 12:43

## 2017-01-01 RX ADMIN — CHLORHEXIDINE GLUCONATE 15 ML: 1.2 RINSE ORAL at 20:59

## 2017-01-01 RX ADMIN — CEFTRIAXONE 1 G: 1 INJECTION, POWDER, FOR SOLUTION INTRAMUSCULAR; INTRAVENOUS at 18:18

## 2017-01-01 RX ADMIN — FAMOTIDINE 20 MG: 10 INJECTION, SOLUTION INTRAVENOUS at 09:34

## 2017-01-01 RX ADMIN — HYDROMORPHONE HYDROCHLORIDE 0.5 MG: 1 INJECTION, SOLUTION INTRAMUSCULAR; INTRAVENOUS; SUBCUTANEOUS at 01:29

## 2017-01-01 RX ADMIN — MORPHINE SULFATE 5 MG: 4 INJECTION, SOLUTION INTRAMUSCULAR; INTRAVENOUS at 06:02

## 2017-01-01 RX ADMIN — FENTANYL CITRATE 25 MCG: 50 INJECTION, SOLUTION INTRAMUSCULAR; INTRAVENOUS at 11:31

## 2017-01-01 RX ADMIN — MORPHINE SULFATE 10 MG: 100 SOLUTION ORAL at 14:24

## 2017-01-01 RX ADMIN — MORPHINE SULFATE 8 MG: 4 INJECTION, SOLUTION INTRAMUSCULAR; INTRAVENOUS at 09:06

## 2017-01-01 RX ADMIN — LISINOPRIL 2.5 MG: 2.5 TABLET ORAL at 09:35

## 2017-01-01 RX ADMIN — FENTANYL CITRATE 25 MCG: 50 INJECTION, SOLUTION INTRAMUSCULAR; INTRAVENOUS at 11:59

## 2017-01-01 RX ADMIN — PROPOFOL 40 MCG/KG/MIN: 10 INJECTION, EMULSION INTRAVENOUS at 23:31

## 2017-01-01 RX ADMIN — MORPHINE SULFATE 2 MG: 2 INJECTION, SOLUTION INTRAMUSCULAR; INTRAVENOUS at 23:32

## 2017-01-01 RX ADMIN — FENTANYL CITRATE 50 MCG: 50 INJECTION, SOLUTION INTRAMUSCULAR; INTRAVENOUS at 10:01

## 2017-01-01 RX ADMIN — HYDROMORPHONE HYDROCHLORIDE 0.5 MG: 1 INJECTION, SOLUTION INTRAMUSCULAR; INTRAVENOUS; SUBCUTANEOUS at 16:22

## 2017-01-01 RX ADMIN — GLYCOPYRROLATE 0.2 MG: 0.2 INJECTION, SOLUTION INTRAMUSCULAR; INTRAVENOUS at 08:51

## 2017-01-01 RX ADMIN — MORPHINE SULFATE 2 MG: 2 INJECTION, SOLUTION INTRAMUSCULAR; INTRAVENOUS at 10:25

## 2017-01-01 RX ADMIN — GLYCOPYRROLATE 0.4 MG: 0.2 INJECTION, SOLUTION INTRAMUSCULAR; INTRAVENOUS at 06:02

## 2017-01-01 RX ADMIN — GLYCOPYRROLATE 0.2 MG: 0.2 INJECTION, SOLUTION INTRAMUSCULAR; INTRAVENOUS at 18:54

## 2017-01-01 RX ADMIN — GLYCOPYRROLATE 0.4 MG: 0.2 INJECTION, SOLUTION INTRAMUSCULAR; INTRAVENOUS at 15:08

## 2017-01-01 RX ADMIN — FAMOTIDINE 20 MG: 10 INJECTION, SOLUTION INTRAVENOUS at 20:53

## 2017-01-01 RX ADMIN — HYDROMORPHONE HYDROCHLORIDE 0.5 MG: 1 INJECTION, SOLUTION INTRAMUSCULAR; INTRAVENOUS; SUBCUTANEOUS at 23:28

## 2017-01-01 RX ADMIN — CEFTRIAXONE 1 G: 1 INJECTION, POWDER, FOR SOLUTION INTRAMUSCULAR; INTRAVENOUS at 16:34

## 2017-01-01 RX ADMIN — IOPAMIDOL 70 ML: 755 INJECTION, SOLUTION INTRAVENOUS at 12:43

## 2017-01-01 RX ADMIN — FENTANYL CITRATE 25 MCG: 50 INJECTION, SOLUTION INTRAMUSCULAR; INTRAVENOUS at 12:13

## 2017-01-01 RX ADMIN — MIDAZOLAM HYDROCHLORIDE 0.5 MG: 1 INJECTION, SOLUTION INTRAMUSCULAR; INTRAVENOUS at 10:41

## 2017-01-01 RX ADMIN — SODIUM CHLORIDE 500 ML: 9 INJECTION, SOLUTION INTRAVENOUS at 12:23

## 2017-01-01 RX ADMIN — HYDRALAZINE HYDROCHLORIDE 10 MG: 20 INJECTION INTRAMUSCULAR; INTRAVENOUS at 18:06

## 2017-01-01 RX ADMIN — SODIUM CHLORIDE: 9 INJECTION, SOLUTION INTRAVENOUS at 18:52

## 2017-01-01 RX ADMIN — SODIUM CHLORIDE: 9 INJECTION, SOLUTION INTRAVENOUS at 01:57

## 2017-01-01 RX ADMIN — FENTANYL CITRATE 25 MCG: 50 INJECTION, SOLUTION INTRAMUSCULAR; INTRAVENOUS at 11:07

## 2017-01-01 RX ADMIN — MORPHINE SULFATE 2 MG: 2 INJECTION, SOLUTION INTRAMUSCULAR; INTRAVENOUS at 11:33

## 2017-01-01 RX ADMIN — VITAMIN D, TAB 1000IU (100/BT) 2000 UNITS: 25 TAB at 09:35

## 2017-01-01 RX ADMIN — PROPOFOL 30 MCG/KG/MIN: 10 INJECTION, EMULSION INTRAVENOUS at 18:05

## 2017-01-01 RX ADMIN — GLYCOPYRROLATE 0.2 MG: 0.2 INJECTION, SOLUTION INTRAMUSCULAR; INTRAVENOUS at 10:18

## 2017-01-01 RX ADMIN — MORPHINE SULFATE 2 MG: 2 INJECTION, SOLUTION INTRAMUSCULAR; INTRAVENOUS at 05:02

## 2017-01-01 RX ADMIN — GLYCOPYRROLATE 0.2 MG: 0.2 INJECTION, SOLUTION INTRAMUSCULAR; INTRAVENOUS at 15:47

## 2017-01-01 RX ADMIN — CHLORHEXIDINE GLUCONATE 15 ML: 1.2 RINSE ORAL at 09:35

## 2017-01-01 RX ADMIN — MORPHINE SULFATE 2 MG: 2 INJECTION, SOLUTION INTRAMUSCULAR; INTRAVENOUS at 07:25

## 2017-01-01 RX ADMIN — HUMAN ALBUMIN MICROSPHERES AND PERFLUTREN 3 ML: 10; .22 INJECTION, SOLUTION INTRAVENOUS at 08:48

## 2017-01-01 RX ADMIN — SODIUM CHLORIDE: 9 INJECTION, SOLUTION INTRAVENOUS at 06:35

## 2017-01-01 RX ADMIN — GLYCOPYRROLATE 0.2 MG: 0.2 INJECTION, SOLUTION INTRAMUSCULAR; INTRAVENOUS at 14:07

## 2017-01-01 RX ADMIN — FENTANYL CITRATE 50 MCG: 50 INJECTION, SOLUTION INTRAMUSCULAR; INTRAVENOUS at 10:04

## 2017-01-01 RX ADMIN — ACETAMINOPHEN 1000 MG: 10 INJECTION, SOLUTION INTRAVENOUS at 22:33

## 2017-01-01 RX ADMIN — SODIUM CHLORIDE: 9 INJECTION, SOLUTION INTRAVENOUS at 05:06

## 2017-01-01 RX ADMIN — MIDAZOLAM HYDROCHLORIDE 0.5 MG: 1 INJECTION, SOLUTION INTRAMUSCULAR; INTRAVENOUS at 11:30

## 2017-01-01 RX ADMIN — MORPHINE SULFATE 10 MG: 100 SOLUTION ORAL at 12:35

## 2017-01-01 RX ADMIN — MORPHINE SULFATE 2 MG: 2 INJECTION, SOLUTION INTRAMUSCULAR; INTRAVENOUS at 02:23

## 2017-01-01 RX ADMIN — FENTANYL CITRATE 25 MCG: 50 INJECTION, SOLUTION INTRAMUSCULAR; INTRAVENOUS at 12:09

## 2017-01-01 RX ADMIN — PROPOFOL 40 MCG/KG/MIN: 10 INJECTION, EMULSION INTRAVENOUS at 04:39

## 2017-01-01 RX ADMIN — SODIUM CHLORIDE: 9 INJECTION, SOLUTION INTRAVENOUS at 16:35

## 2017-01-01 RX ADMIN — ASPIRIN 81 MG: 81 TABLET, COATED ORAL at 08:03

## 2017-01-01 RX ADMIN — LORAZEPAM 0.5 MG: 0.5 TABLET ORAL at 20:15

## 2017-01-01 RX ADMIN — IOPAMIDOL 185 ML: 612 INJECTION, SOLUTION INTRAVENOUS at 13:49

## 2017-01-01 RX ADMIN — HEPARIN SODIUM 800 UNITS/HR: 10000 INJECTION, SOLUTION INTRAVENOUS at 15:17

## 2017-01-01 RX ADMIN — FENTANYL CITRATE 25 MCG: 50 INJECTION, SOLUTION INTRAMUSCULAR; INTRAVENOUS at 10:41

## 2017-01-01 RX ADMIN — GLYCOPYRROLATE 0.4 MG: 0.2 INJECTION, SOLUTION INTRAMUSCULAR; INTRAVENOUS at 19:40

## 2017-01-01 RX ADMIN — FAMOTIDINE 20 MG: 10 INJECTION, SOLUTION INTRAVENOUS at 20:59

## 2017-01-01 RX ADMIN — GLYCOPYRROLATE 0.4 MG: 0.2 INJECTION, SOLUTION INTRAMUSCULAR; INTRAVENOUS at 23:37

## 2017-01-01 RX ADMIN — FENTANYL CITRATE 25 MCG: 50 INJECTION, SOLUTION INTRAMUSCULAR; INTRAVENOUS at 11:18

## 2017-01-01 RX ADMIN — FENTANYL CITRATE 25 MCG: 50 INJECTION, SOLUTION INTRAMUSCULAR; INTRAVENOUS at 12:48

## 2017-01-01 RX ADMIN — SODIUM CHLORIDE 100 ML: 9 INJECTION, SOLUTION INTRAVENOUS at 08:25

## 2017-01-01 RX ADMIN — GLYCOPYRROLATE 0.4 MG: 0.2 INJECTION, SOLUTION INTRAMUSCULAR; INTRAVENOUS at 05:02

## 2017-01-01 RX ADMIN — Medication 100 MG: at 09:35

## 2017-01-01 RX ADMIN — MIDAZOLAM HYDROCHLORIDE 1 MG: 1 INJECTION, SOLUTION INTRAMUSCULAR; INTRAVENOUS at 09:57

## 2017-01-01 RX ADMIN — PROPOFOL 5 MCG/KG/MIN: 10 INJECTION, EMULSION INTRAVENOUS at 13:44

## 2017-01-01 RX ADMIN — HYDRALAZINE HYDROCHLORIDE 20 MG: 20 INJECTION INTRAMUSCULAR; INTRAVENOUS at 01:25

## 2017-01-01 RX ADMIN — HEPARIN SODIUM 1200 UNITS/HR: 10000 INJECTION, SOLUTION INTRAVENOUS at 12:28

## 2017-01-01 RX ADMIN — MORPHINE SULFATE 2 MG: 2 INJECTION, SOLUTION INTRAMUSCULAR; INTRAVENOUS at 18:43

## 2017-01-01 RX ADMIN — PROPOFOL 50 MG: 10 INJECTION, EMULSION INTRAVENOUS at 13:47

## 2017-01-01 RX ADMIN — CHLORHEXIDINE GLUCONATE 15 ML: 1.2 RINSE ORAL at 20:54

## 2017-01-01 RX ADMIN — MORPHINE SULFATE 5 MG: 4 INJECTION, SOLUTION INTRAMUSCULAR; INTRAVENOUS at 12:33

## 2017-01-01 RX ADMIN — FENTANYL CITRATE 25 MCG: 50 INJECTION, SOLUTION INTRAMUSCULAR; INTRAVENOUS at 11:52

## 2017-01-01 RX ADMIN — PROPOFOL 50 MG: 10 INJECTION, EMULSION INTRAVENOUS at 13:42

## 2017-01-01 RX ADMIN — IOPAMIDOL 120 ML: 755 INJECTION, SOLUTION INTRAVENOUS at 08:24

## 2017-01-01 RX ADMIN — ASPIRIN 81 MG: 81 TABLET, COATED ORAL at 09:35

## 2017-01-01 RX ADMIN — SODIUM CHLORIDE: 9 INJECTION, SOLUTION INTRAVENOUS at 19:38

## 2017-01-01 RX ADMIN — FENTANYL CITRATE 25 MCG: 50 INJECTION, SOLUTION INTRAMUSCULAR; INTRAVENOUS at 13:30

## 2017-01-01 RX ADMIN — GLYCOPYRROLATE 0.4 MG: 0.2 INJECTION, SOLUTION INTRAMUSCULAR; INTRAVENOUS at 23:49

## 2017-01-01 RX ADMIN — MORPHINE SULFATE 10 MG: 100 SOLUTION ORAL at 15:50

## 2017-01-01 ASSESSMENT — ENCOUNTER SYMPTOMS
NUMBNESS: 1
SPEECH DIFFICULTY: 1
WEAKNESS: 1
HEADACHES: 1
FACIAL ASYMMETRY: 1

## 2017-01-01 ASSESSMENT — VISUAL ACUITY
OU: BASELINE

## 2017-08-15 PROBLEM — R53.1 LEFT-SIDED WEAKNESS: Status: ACTIVE | Noted: 2017-01-01

## 2017-08-15 NOTE — H&P
"DATE OF ADMISSION:  08/15/2017.      PRIMARY CARE PHYSICIAN:  She does not have a primary care physician.      CHIEF COMPLAINT:  Right-sided numbness and weakness.      HISTORY OF PRESENT ILLNESS:  I cannot obtain from the patient who is a relatively good historian.  Her sister and her  were also present at the time of my examination and provided some information.      Ms. Fouzia Beltrán is a very pleasant 61-year-old female with past medical history of hypertension, not on any medications and tobacco use disorder who came in for evaluation of right-sided numbness and weakness.  The patient reports that for the last few weeks starting in July, she has had intermittent episodes of left-sided body feeling \"funny.\"  She attributed this to the fact of being dehydrated and each time the symptoms would resolve in a few minutes after she was drinking some water.  This morning she woke up feeling okay.  Later on around 7:00 a.m., she started again having that \"funny feeling on the left side of the body including left leg, left arm and left side of the face.  She reports that she felt that she was clumsy with her left hand as she was not able to hold her cup as usual.  She again thought that she may be dehydrated.  She drank some water and reportedly she started coughing with drinking water and she felt that water was going down the wrong way.  She felt that she is having a hard time walking.  Her  noticed that around 10:00 a.m. she was having some left facial droop and some slurred speech.  He thought she might be having a stroke, so he gave her 4 baby aspirin.  She had slurred speech and left facial droop which apparently lasted for a few minutes only then resolved completely.  She continued to have \"a funny feeling on the left side of the body\" throughout the day.  Apparently her sister called her around 4:00 p.m. and her sister thought that she woke the patient up as she was not acting just right\"; because of " persistent symptoms, her  convinced her to come to ER for further evaluation.  It seems that after arrival in the ER, her symptoms got much better although on the left side of her body was not completely resolved.  Apparently when she tried to go to the bathroom, she was weak on the left side.      Upon further questioning, the patient reports that in the morning she had a slight headache that now has resolved.  Otherwise, she denies any recent fevers, no chest pain, no palpitations, no shortness of breath, no abdominal pain, no nausea, no vomiting, no diarrhea, no constipation, no dysuria, no leg swelling.      The patient is not aware of having any strokes in the past.      In the ER, the patient was seen by Dr. Blank.  Her initial vitals showed blood pressure 193/90, heart rate 90, respiratory rate 16, oxygen saturation 96% on room air, temperature 98.3.  She did have basic blood work which was unremarkable.   BMP, her glucose was 104.  CBC with no leukocytosis, hemoglobin 16.1, normal hematocrit and platelet count.  INR was 0.92.  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  CT angiogram of the head and neck showed no evidence of thrombophlebitis, no stenosis or dissection or aneurysm, but it did show mild atherosclerotic disease involving both carotid bifurcation without any stenosis or dissection.  Her EKG showed normal sinus rhythm at the rate of 80 beats per minute with no ischemic changes.      Because of ongoing stroke-like symptoms Hospitalist Service was called regarding the admission.      PAST MEDICAL HISTORY:   1.  History of hypertension, not taking any medications.   2.  Old strokes as per CAT scan findings.  Also, patient is not aware of any episodes of stroke-like symptoms in the past.   3.  Tobacco use disorder.        PAST SURGICAL HISTORY:  None.      FAMILY HISTORY:   Reviewed and noncontributory to current admission.     SOCIAL HISTORY:  The patient is a current smoker.  She reports smoking a couple of cigars daily.  She also reports drinking a couple of glasses of wine every evening.  She denies illicit drug abuse.        MEDICATIONS AT HOME:  Apparently, she is not taking any medication at home.      ALLERGIES:  She does not have any known drug allergies.      REVIEW OF SYSTEMS:  The 10-point review of systems was conducted and it was negative except for pertinent positives mentioned in history of present illness.      PHYSICAL EXAMINATION:   VITAL SIGNS:  Blood pressure is 176/90, heart rate 81, respiratory rate 18, oxygen saturation 97% on room air, temperature 98.3.   GENERAL:  The patient is awake, alert, no acute distress at the time of my examination.   HEENT:  Normocephalic, atraumatic.  Pupils are equally round and reactive to light.  Oral mucosa is moist.   NECK:  Supple.  No cervical lymphadenopathy, no thyromegaly.   CHEST:  There is bilateral air entry with some bibasilar crackles bilateral, no wheezing, no rales.   CARDIOVASCULAR:  There is normal S1, S2, regular rate and rhythm, no murmurs, no rubs.   ABDOMEN:  Soft, nontender, nondistended.  Bowel sounds are present.  No hepatosplenomegaly.   EXTREMITIES:  There is no leg swelling, no calf tenderness, 2+ peripheral pulses are palpable.   SKIN:  Intact, no rashes, no cyanosis.   NEUROLOGIC:  The patient is awake, alert, oriented to self, place and time.  There is no facial droop, no slurred speech at the time of my examination.  Pupils are equally round and reactive to light.  Motor strength is symmetric, 5/5 intensity, both upper and lower extremities.  Sensory is grossly intact.   PSYCHIATRIC:  She is mildly anxious.      LABORATORY DATA:  Sodium 138, potassium 3.6, chloride 103, bicarbonate 24, BUN 10, creatinine 0.76, calcium is 10.4, anion gap of 11, glucose 104.  White blood cells 7.5, hemoglobin 16.1,  hematocrit 46.2, platelet count 198.  INR 0.92.      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  CT angiogram of the head and neck showed no evidence of thromboembolism, stenosis, dissection or aneurysm.  It just showed some mild atherosclerotic disease involving both carotid bifurcation, but no significant stenosis.      ASSESSMENT AND PLAN:  Ms. Fouzia Beltrán is a very pleasant 61-year-old female with past medical history of hypertension, tobacco use disorder and prior history of strokes as per CAT scan findings, although she is not aware of this, who came in for evaluation of left-sided weakness and numbness.   1.  Stroke-like symptoms.  She reports a funny feeling on the left side of the body including left lower extremity, left upper extremity and left side of the face starting this morning that continued throughout the day.  Her  also reported that she had associated slurred speech and left facial droop for a few minutes that has resolved now.  Apparently she is having these symptoms on and off for the last month or so which she attributed to being dehydrated.  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  CT angiogram of the head and neck did not show any acute pathology except mild atherosclerotic disease involving both carotid bifurcation without any stenosis or dissection.  She did take 4 baby aspirins at home.  She continues to feel weak, slightly on the left side.  Despite a negative CAT scan, I still think she is having a stroke, so plan is to admit her to station 73 as inpatient status.  She will be monitored on telemetry.  Stroke protocol was initiated with frequent neuro checks.  Will allow for permissive hypertension for now with hydralazine and  labetalol IV p.r.n. order for systolic blood pressure more than 180.  Initially she will need to be started on antihypertensive medications as it seems that she has history of hypertension, but never treated.  Will continue with aspirin 81 mg p.o. daily starting tomorrow.  She will have an echocardiogram, will check her fasting lipid profile, hemoglobin A1c, TSH and vitamin D screening.  I did order an MRI of the brain.  For tonight she is somehow worried about having an MRI.  Ativan 0.5 mg p.o. t.i.d. p.r.n. had been ordered since she seems quite anxious.  We will keep her n.p.o. for now, especially given the fact she reported some dysphagia earlier today.  IV fluids, normal saline at 100 mL per hour has been ordered.  She will be seen by PT, OT, speech evaluation in the morning.  Formal Neurology consult was requested.    2.  Hypertension.  She reports that doctors told her that she has hypertension in the past, but she was never on medications.  Now she presents with elevated blood pressures, but in the setting of suspected stroke will allow for permissive hypertension.  Eventually she will need to be started on some antihypertensive medications, consider Norvasc, beta blocker or ACE inhibitor.   3.  Tobacco use disorder.  She reports smoking cigars daily.  Nicotine patch had been offered, but she refused it at this time.  She will have to quit smoking.   4.  Deep venous thrombosis prophylaxis.  Pneumatic compression device.      CODE STATUS:  Discussed with the patient and patient is full code.      DISPOSITION:  I anticipate a couple of days of hospitalization given the suspected new stroke.         PHILIP DELCID MD             D: 08/15/2017 16:19   T: 08/15/2017 17:03   MT: JAYCE      Name:     KATRIN ELY   MRN:      [id number]        Account:      [id number]   :      1955           Admitted:     [id number]      Document: [id number]       cc: [facility]

## 2017-08-15 NOTE — ED PROVIDER NOTES
"  History     Chief Complaint:  Numbness    HPI   Fouzia Beltrán is a 61 year old female who presents to the emergency department today for evaluation of left sided numbness and weakness. The patient reports the onset of left sided numbness, weakness on left hand, and a slight headache that developed shortly after the patient woke up at 0700 and made coffee today. Her  noted some left facial droop and slurred speech that lasted several minutes around 1100, which have since resolved. Still feels that her side of body feels funny. Was able to walk normally into ED. The patient took aspirin 324 mg before coming into ED, but is not on any other regular medications. Here, the patient \"feels better\" and is thinking clearly, but still has slight left sided numbness. Of note, the patient first had left sided numbness that started a month ago and has been off and on. She has been having similar episodes intermittently since, although today's episode has been markedly \"worse\" with new slurred speech and facial droop.    Allergies:  No Known Drug Allergies    Medications:    The patient is currently on no regular medications.      Past Medical History:    Hypertension    Past Surgical History:    History reviewed. No pertinent past surgical history.    Family History:    History reviewed. No pertinent family history.     Social History:  The patient was accompanied to the ED by her  and daughter.  Smoking Status: Current some day smoker  Smokeless Tobacco: Never used  Alcohol Use: Yes  Marital Status:      Review of Systems   Neurological: Positive for facial asymmetry, speech difficulty, weakness, numbness and headaches.   All other systems reviewed and are negative.    Physical Exam   Vitals:  Patient Vitals for the past 24 hrs:   BP Temp Temp src Pulse Heart Rate Resp SpO2 Height   08/15/17 1530 176/90 - - - 81 18 97 % -   08/15/17 1515 - - - - 70 19 96 % -   08/15/17 1500 (!) 181/94 - - - 64 13 98 % - " "  08/15/17 1445 - - - - 64 19 96 % -   08/15/17 1430 165/88 - - - 69 19 97 % -   08/15/17 1400 (!) 171/91 - - - 71 11 98 % -   08/15/17 1315 - - - - 76 22 100 % -   08/15/17 1313 (!) 163/94 - - - 79 26 - -   08/15/17 1300 - - - - 79 12 98 % -   08/15/17 1223 - - - - - - 98 % -   08/15/17 1222 (!) 205/112 - - - - - 98 % -   08/15/17 1212 - 98.1  F (36.7  C) Oral 86 - 16 96 % -   08/15/17 1209 193/90 98.3  F (36.8  C) Oral - 90 - 96 % 1.626 m (5' 4\")       Physical Exam  General: Resting comfortably on the gurney  Eyes:  The pupils are equal and round    Conjunctivae and sclerae are normal  ENT:    Moist mucous membranes  Neck:  Normal range of motion  CV:  Regular rate and rhythm    Skin warm and well perfused   Resp:  Lungs are clear    Non-labored    No rales    No wheezing   GI:  Abdomen is soft, there is no rigidity    No distension    No rebound tenderness     No abdominal tenderness  MS:  Normal muscular tone  Skin:  No rash or acute skin lesions noted  Neuro:   Awake, alert    Speech is normal and fluent    Face is symmetric    Moves all extremities equally    Normal finger-nose-finger     strength equal bilaterally    EOMI    Equal sensation bilaterally    No arm or leg drift    Oriented x3  Psych:  Normal affect.  Appropriate interactions.     Emergency Department Course     ECG:  ECG taken at 1219, ECG read at 1221  Normal sinus rhythm  Low voltage QRS  Cannot rule out anterior infarct, age undetermined  Abnormal ECG  Rate 81 bpm. MD interval 152. QRS duration 98. QT/QTc 414/480. P-R-T axes 63 8 47.    Imaging:  Radiology findings were communicated with the patient, family and Admitting MD who voiced understanding of the findings.    CT Head w/o contrast  Old left paramedian shellie and right occipital lobe infarct.  No evidence for intracranial hemorrhage or any acute process.  Reading per radiology    CT head Neck Angio w/o & w contrast  1. Mild atherosclerotic disease involving both carotid " bifurcations  without any stenosis or dissection.  2. No evidence for thromboembolism, stenosis, dissection, or aneurysm.  Reading per radiology    Laboratory:  Laboratory findings were communicated with the patient, family and Admitting MD who voiced understanding of the findings.    CBC: HGB 16.1 (H) o/w WNL. (WBC 10.5, )   BMP: Glucose 104 (H) o/w WNL (Creatinine 0.76)  INR: 0.92    Interventions:  1223 ns 500 mL IV     Emergency Department Course:  Nursing notes and vitals reviewed.  I performed an exam of the patient as documented above.   1211: I entered the stabilization room  1212: Oxygen sat 97%, blood pressure at 194/105  1212: Physical examination   1215: IV was inserted and blood was drawn for laboratory testing, results above.  1230: The patient was sent for a CT Head w/o Contrast and CT head Neck Angio w/o & w contrast while in the emergency department, results above.   At 1347 the patient was rechecked and patient and family were updated on the results of laboratory and imaging studies.   I discussed the treatment plan with the patient and family. They expressed understanding of this plan and consented to admission. I discussed the patient with Dr. James, who will admit the patient to a monitored bed for further evaluation and treatment.  I personally reviewed the laboratory and imaging results with the patient and family and answered all related questions prior to admission.    Impression & Plan      Medical Decision Making:  Fouzia Beltrán is a 61 year old female who presents to the emergency department with numbness. Blood pressure was quite elevated but did improve in ED. She already took full strength aspirin earlier today. I am not finding any focal deficits on examination, though she was reporting subjective numbness on the left side of her body. She is outside of the TPA window given that symptoms started at around 0700 this morning (~5 hours ago) and NIH 0 and has had off and on  symptoms for the last few weeks. CT head and angio studies were done and no stenosis was seen. She does have mild atherosclerotic disease. Nurse reports that when she was ambulating, she required assistance walking and seemed to favor left leg though again on repeat exam, no arm or leg weakness when sitting in bed. I suspect that she may have had a stroke, though no emergent intervention indicated at this time. She already took aspirin today. Patient will be admitted to the hospitalist service. I discussed the patient with the hospitalist service who agrees to accept the patient.    Diagnosis:    ICD-10-CM    1. Cerebrovascular accident (CVA), unspecified mechanism (H) I63.9      Disposition:   Admission to neuro inpatient under Dr. Erika Lackey Disclosure:  I, Onelia Knapp, am serving as a scribe at 12:10 PM on 8/15/2017 to document services personally performed by Sarah Blank MD, based on my observations and the provider's statements to me.    8/15/2017    EMERGENCY DEPARTMENT       Sarah Blank MD  08/15/17 [id number]

## 2017-08-15 NOTE — PHARMACY-ADMISSION MEDICATION HISTORY
Admission medication history interview status for the 8/15/2017  admission is complete. See EPIC admission navigator for prior to admission medications     Medication history source reliability:Good    Actions taken by pharmacist (provider contacted, etc):None     Additional medication history information not noted on PTA med list : multivitmin    Medication reconciliation/reorder completed by provider prior to medication history? No    Time spent in this activity: 5 min    Prior to Admission medications    Medication Sig Last Dose Taking? Auth Provider   multivitamin, therapeutic with minerals (THERA-VIT-M) TABS tablet Take 1 tablet by mouth daily 8/14/2017 at Unknown time Yes Unknown, Entered By History

## 2017-08-15 NOTE — IP AVS SNAPSHOT
[facility]    [address]    [place] [address]    Phone:  [phone]                                       After Visit Summary   8/15/2017    Fouzia Beltrán    MRN: [id number]           After Visit Summary Signature Page     I have received my discharge instructions, and my questions have been answered. I have discussed any challenges I see with this plan with the nurse or doctor.    ..........................................................................................................................................  Patient/Patient Representative Signature      ..........................................................................................................................................  Patient Representative Print Name and Relationship to Patient    ..................................................               ................................................  Date                                            Time    ..........................................................................................................................................  Reviewed by Signature/Title    ...................................................              ..............................................  Date                                                            Time

## 2017-08-15 NOTE — ED NOTES
[facility]  ED Nurse Handoff Report    ED Chief complaint: Numbness (left side numbness, dizziness since morning)      ED Diagnosis:   Final diagnoses:   None       Code Status: Full Code    Allergies: No Known Allergies    Activity level - Baseline/Home:  Independent    Activity Level - Current:   Stand with Assist     Needed?: No    Isolation: Yes  Infection: Not Applicable    Bariatric?: No    Vital Signs:   Vitals:    08/15/17 1223 08/15/17 1300 08/15/17 1313 08/15/17 1315   BP:   (!) 163/94    Pulse:       Resp:  12 26 22   Temp:       TempSrc:       SpO2: 98% 98%  100%   Height:           Cardiac Rhythm: ,        Pain level: 0-10 Pain Scale: 0    Is this patient confused?: No    Patient Report: Initial Complaint: left sided weakness and numbess  Focused Assessment: pt was brought in by daughter with complaints of numbness and weakness on the left side; this has been going on and off for over a month. Today her family notice she was having left face droop and slurred speech. Ct was negative. While walking to restroom, she did appear to have some trouble with her balance.     Tests Performed: Ct and blood  Abnormal Results: see results  Treatments provided: fluids    Family Comments:  and daughter @ bedside.     OBS brochure/video discussed/provided to patient: N/A    ED Medications:   Medications   0.9% sodium chloride BOLUS (0 mLs Intravenous Stopped 8/15/17 1326)   iopamidol (ISOVUE-370) solution 70 mL (70 mLs Intravenous Given 8/15/17 1243)   sodium chloride 0.9 % for CT scan flush dose 100 mL (100 mLs Intravenous Given 8/15/17 1243)       Drips infusing?:  No      ED NURSE PHONE NUMBER: *[id number]

## 2017-08-15 NOTE — PROGRESS NOTES
"   08/15/17 1701   General Information   Onset Date 08/15/17   Start of Care Date 08/15/17   Referring Physician Dr. James   Patient Profile Review/OT: Additional Occupational Profile Info See Profile for full history and prior level of function   Patient/Family Goals Statement Patient is hungry.   Swallowing Evaluation Bedside swallow evaluation   Behaviorial Observations Alert   Mode of current nutrition NPO   Respiratory Status Room air   Comments Per MD note: Fouzia Beltrán is a 61 year old female who presents to the emergency department today for evaluation of left sided numbness and weakness. The patient reports the onset of left sided numbness, weakness on left hand, and a slight headache that developed shortly after the patient woke up at 0700 and made coffee today. Her  noted some left facial droop and slurred speech that lasted several minutes around 1100, which have since resolved. Still feels that her side of body feels funny. Was able to walk normally into ED. The patient took aspirin 324 mg before coming into ED, but is not on any other regular medications. Here, the patient \"feels better\" and is thinking clearly, but still has slight left sided numbness. Of note, the patient first had left sided numbness that started a month ago and has been off and on. She has been having similar episodes intermittently since, although today's episode has been markedly \"worse\" with new slurred speech and facial droop. 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   Clinical Swallow Evaluation   Oral Musculature anomalies present   Structural Abnormalities none present   Dentition present and adequate   Mucosal Quality dry   Mandibular Strength and Mobility intact   Oral Labial Strength and Mobility impaired retraction  (Minimal left facial droop. )   Lingual Strength and Mobility impaired protrusion  (Deviates )   Velar Elevation intact   Buccal Strength and Mobility " intact   Laryngeal Function Cough;Throat clear;Swallow;Voicing initiated  (Not able to complete a dry swallow. )   Oral Musculature Comments Mild impariment.   Additional evaluation(s) completed today Recommended   Rationale for completing additional evaluation May need video swallow study if Sx of aspiration noted with her meal.    Swallow Eval   Feeding Assistance no assistance needed   Clinical Swallow Eval: Thin Liquid Texture Trial   Mode of Presentation, Thin Liquids cup;spoon;self-fed   Volume of Liquid or Food Presented 4 oz of water.    Oral Phase of Swallow Premature pharyngeal entry   Pharyngeal Phase of Swallow impaired;coughing/choking   Diagnostic Statement One episode of aspiration likely due to mild delay in swallowing.    Clinical Swallow Eval: Nectar Thick Liquid Texture Trial   Mode of Presentation, Nectar cup;self-fed   Volume of Nectar Presented 4 oz of nectar thick liquids.    Oral Phase, Nectar Premature pharyngeal entry   Pharyngeal Phase, [place] intact   Diagnostic Statement No overt Sx of aspiration.    Clinical Swallow Eval: Puree Solid Texture Trial   Mode of Presentation, Puree spoon;self-fed   Volume of Puree Presented 3 oz of apple sauce.   Oral Phase, Puree WFL   Pharyngeal Phase, Puree intact   Clinical Swallow Eval: Solid Food Texture Trial   Mode of Presentation, Solid self-fed   Volume of Solid Food Presented 2 faye crackers.   Oral Phase, Solid Poor AP movement;Premature pharyngeal entry   Pharyngeal Phase, Solid impaired;reduction in laryngeal movement;repeated swallows;wet vocal quality after swallow   Diagnostic Statement Slight vocal changes and suspect pharyngeal residue.    Swallow Compensations   Swallow Compensations Alternate viscosity of consistencies;Chin tuck;Pacing;Reduce amounts;Multiple swallow   Results Suspect silent aspiration;Oral difficulties only   General Therapy Interventions   Planned Therapy Interventions Dysphagia Treatment   Dysphagia treatment  Modified diet education;Instruction of safe swallow strategies   Swallow Eval: Clinical Impressions   Skilled Criteria for Therapy Intervention Skilled criteria met.  Treatment indicated.   Functional Assessment Scale (FAS) 4   Treatment Diagnosis Mild to moderate oral and pharyngeal dysphagia   Diet texture recommendations Dysphagia diet level 3;Nectar thick liquids   Recommended Feeding/Eating Techniques alternate between small bites and sips of food/liquid;check mouth frequently for oral residue/pocketing;maintain upright posture during/after eating for 30 mins;no straws;small sips/bites   Therapy Frequency daily   Predicted Duration of Therapy Intervention (days/wks) 5 days   Anticipated Discharge Disposition home w/ outpatient services   Risks and Benefits of Treatment have been explained. Yes   Patient, family and/or staff in agreement with Plan of Care Yes   Clinical Impression Comments Patient presents with mild to moderate oral and pharyngeal dysphagia at bedside secondary to TIA/CVA , MRI is pending. Deficits include; Minimal lingual impairment on oral motor exam. Premature entry of thin liquids with a mild delay with swallow response (timing) that resulted in one episode of overt Sx of aspiration with an immediate cough response. Swallow response was more timely with nectar thick liquids and no overt Sx of aspiration. She tolerated pureed textures without difficulty. Mastication was mildly prolonged for solids, with mildly reduced AP movement of the bolus. Slight vocal changes x1 and suspect pharyngeal retention. She reports difficulty swallowing pills at baseline. Crush medications overnight. Patient is at an elevated risk for aspiration with thin liquids and with solids on residue material suspected. Recommend: 1. Cautiously initiate a Dysphagia Diet level 3 with nectar thick liquids. 2. Upright, no straws, double swallow and alternate liquids/solids. 3. If Sx of aspiration present with her meal, hold  the diet and a  video swallow study can be completed as indicated.    Total Evaluation Time   Total Evaluation Time (Minutes) 20

## 2017-08-15 NOTE — PLAN OF CARE
Problem: Goal Outcome Summary  Goal: Goal Outcome Summary  Discharge Planner SLP   Patient plan for discharge: Patient did not state.  Current status: Bedside swallow evaluation completed. Patient presents with mild to moderate oral and pharyngeal dysphagia at bedside secondary to TIA/CVA , MRI is pending. Deficits include; Minimal lingual impairment on oral motor exam. Premature entry of thin liquids with a mild delay with swallow response (timing) that resulted in one episode of overt Sx of aspiration with an immediate cough response. Swallow response was more timely with nectar thick liquids and no overt Sx of aspiration. She tolerated pureed textures without difficulty. Mastication was mildly prolonged for solids, with mildly reduced AP movement of the bolus. Slight vocal changes x1 and suspect pharyngeal retention. She reports difficulty swallowing pills at baseline. Crush medications overnight. Patient is at an elevated risk for aspiration with thin liquids and with solids on residue material suspected. Recommend: 1. Cautiously initiate a Dysphagia Diet level 3 with nectar thick liquids. 2. Upright, no straws, double swallow and alternate liquids/solids. 3. If Sx of aspiration present with her meal, hold the diet and a  video swallow study can be completed as indicated.   Barriers to return to prior living situation: None per speech perspective.   Recommendations for discharge: Home with OP verses ARU pending further work up.   Rationale for recommendations: ST for swallowing to ensure diet tolerance and swallow strategies with safe advancement.        Entered by: Nevin Sesay 08/15/2017 5:26 PM

## 2017-08-15 NOTE — ED NOTES
Pt ambulated to restroom with assist of one; pt states she feels a little uneasy; pt don't seem stable on feet

## 2017-08-16 NOTE — PROVIDER NOTIFICATION
Page to admitting MD: not able to tolerate MRI after 0.5 PO Ativan. Can we give her more to get through MRI?     Order received for additional ativan (IV). Called MRI, pt was already back in MRI machine and was able to complete without additional dose. Order for IV ativan dc'd

## 2017-08-16 NOTE — PLAN OF CARE
Problem: Goal Outcome Summary  Goal: Goal Outcome Summary  Outcome: Improving  A/o4. VSS. [place] intact L tongue deviation baseline, anxious. Up SBA. Tele: Sinus shavonne. Diet: DD3, nectar. Cont to monitor.

## 2017-08-16 NOTE — PLAN OF CARE
Problem: Goal Outcome Summary  Goal: Goal Outcome Summary  Outcome: Adequate for Discharge Date Met:  08/16/17  Reviewed new medications & there indications. Discharge paperwork discussed with pt & spouse. Discharge home with spouse. NIH 0.

## 2017-08-16 NOTE — PLAN OF CARE
Problem: Goal Outcome Summary  Goal: Goal Outcome Summary  OT: Eval complete as pt demonstrates no skilled OT needs. Pt and family verbalized no concerns discharge home and hopeful for today. Recommend discharge home with increased A from family as needed. Discontinuation of OT services at this time.

## 2017-08-16 NOTE — PLAN OF CARE
Problem: Goal Outcome Summary  Goal: Goal Outcome Summary  Outcome: Improving  A&Ox4. Neuros intact. VSS except HTN--within parameters.  Regular diet. Up independently. Denies pain. Plan for possible discharge this evening.

## 2017-08-16 NOTE — PROGRESS NOTES
08/16/17 1111   Quick Adds   Type of Visit Initial Occupational Therapy Evaluation   Living Environment   Lives With spouse   Living Arrangements house   Home Accessibility stairs to enter home;stairs within home;tub/shower is not walk in   Number of Stairs to Enter Home 2   Number of Stairs Within Home 12   Transportation Available car;family or friend will provide  (Pt still drives; however, family can provide for now. )   Self-Care   Dominant Hand right   Usual Activity Tolerance good   Current Activity Tolerance moderate   Equipment Currently Used at Home none   Functional Level Prior   Ambulation 0-->independent   Transferring 0-->independent   Toileting 0-->independent  (comfort height toilet)   Bathing 0-->independent   Dressing 0-->independent   Eating 0-->independent   Communication 0-->understands/communicates without difficulty   Swallowing 0-->swallows foods/liquids without difficulty   Cognition 0 - no cognition issues reported   Fall history within last six months no   General Information   Onset of Illness/Injury or Date of Surgery - Date 08/15/17   Referring Physician MARY LOU James MD   Patient/Family Goals Statement Pt plans to discharge home today   Additional Occupational Profile Info/Pertinent History of Current Problem Admitted for L sided weakness. Imaging indicated new R shellie infarct and old L shellie, L cerebellar, R occipital, and R parietal lobe infarcts.    Precautions/Limitations fall precautions   General Observations  and daughter present for OT eval.    Cognitive Status Examination   Orientation orientation to person, place and time   Level of Consciousness alert   Visual Perception   Visual Perception Wears glasses;No deficits were identified  (For reading only)   Visual Perception Comments Denies blurred or doubled vision.    Sensory Examination   Sensory Quick Adds No deficits were identified;Light touch   Sensory Comments Pt reports L UE numbness has resolved since admit. Denies B  "UE numbness/tingling at eval.    Sensation Light Touch, Rehab Eval   LUE within normal limits   RUE within normal limits   Pain Assessment   Patient Currently in Pain No   Range of Motion (ROM)   ROM Quick Adds No deficits were identified   ROM Comment B UE WNL   Strength   Manual Muscle Testing Quick Adds No deficits were identified   Strength Comments B UE 5/5 on MMT   Hand Strength   Hand Strength Comments B grasp WFL   Coordination   Upper Extremity Coordination No deficits were identified   Transfer Skill: Bed to Chair/Chair to Bed   Level of [place]: Bed to Chair independent   Transfer Skill: Sit to Stand   Level of [place]: Sit/Stand independent   Transfer Skill: Toilet Transfer   Level of [place]: Toilet independent   Upper Body Dressing   Level of [place]: Dress Upper Body independent   Lower Body Dressing   Level of [place]: Dress Lower Body independent   Toileting   Level of [place]: Toilet independent   Grooming   Level of [place]: Grooming independent   Eating/Self Feeding   Level of [place]: Eating independent   Instrumental Activities of Daily Living (IADL)   Previous Responsibilities driving;laundry;housekeeping;meal prep   Clinical Impression   Criteria for Skilled Therapeutic Interventions Met no;evaluation only   Clinical Decision Making (Complexity) Low complexity   Anticipated Discharge Disposition Home with Assist   [facility] [employer] TM \"6 Clicks\"   2016, Trustees of [facility], under license to [employer].  All rights reserved.   6 Clicks Short Forms Daily Activity Inpatient Short Form   [facility]  \"6 Clicks\" Daily Activity Inpatient Short Form   1. Putting on and taking off regular lower body clothing? 4 - None   2. Bathing (including washing, rinsing, drying)? 4 - None   3. Toileting, which includes using toilet, bedpan or urinal? 4 - None   4. Putting on and taking off regular upper body clothing? 4 - None   5. Taking " care of personal grooming such as brushing teeth? 4 - None   6. Eating meals? 4 - None   Daily Activity Raw Score (Score out of 24.Lower scores equate to lower levels of function) 24   Total Evaluation Time   Total Evaluation Time (Minutes) 12       OT/CR: Order received for smoking cessation consult. Eval completed with education provided. Pt open to quitting, but not open to discuss. Pt reports she smokes several cigars daily. Pt and family aware of risks with smoking and further health concerns.

## 2017-08-16 NOTE — PROGRESS NOTES
08/16/17 0900   Quick Adds   Type of Visit Initial PT Evaluation   Living Environment   Lives With spouse   Living Arrangements house   Home Accessibility stairs to enter home;stairs within home   Number of Stairs to Enter Home 2   Number of Stairs Within Home 12   Stair Railings at Home outside, present on right side;inside, present on right side   Transportation Available car   Living Environment Comment Pt noted she doesn't use stairs within home   Self-Care   Dominant Hand right   Usual Activity Tolerance good   Current Activity Tolerance moderate   Regular Exercise yes   Activity/Exercise Type swimming;walking   Exercise Amount/Frequency 3-5 times/wk   Equipment Currently Used at Home raised toilet   Functional Level Prior   Ambulation 0-->independent   Transferring 0-->independent   Toileting 0-->independent   Bathing 0-->independent   Dressing 0-->independent   Eating 0-->independent   Communication 0-->understands/communicates without difficulty   Swallowing 0-->swallows foods/liquids without difficulty   Cognition 0 - no cognition issues reported   Fall history within last six months no   General Information   Onset of Illness/Injury or Date of Surgery - Date 08/15/17   Referring Physician Breann James MD   Patient/Family Goals Statement Return home   Pertinent History of Current Problem (include personal factors and/or comorbidities that impact the POC) Pt is a 62 yo female who was admitted on 8/15/17 with right sided weakness and numbness.   Precautions/Limitations fall precautions   Cognitive Status Examination   Orientation orientation to person, place and time   Level of Consciousness alert   Follows Commands and Answers Questions 100% of the time   Personal Safety and Judgment intact   Cognitive Comment Pt able to hold conversation throughout evaluation but memory not formally assessed   Pain Assessment   Patient Currently in Pain No   Integumentary/Edema   Integumentary/Edema no deficits  "were identifed   Posture    Posture Forward head position   Range of Motion (ROM)   ROM Comment Functional ROM WNL   Strength   Strength Comments Antigravity strength BLE   Bed Mobility   Bed Mobility Comments supine<>sit independent   Transfer Skills   Transfer Comments sit<>stand independent   Gait   Gait Comments Pt ambulated 5 ft SBA for managemet of IV pole   Balance   Balance Comments PT ambulated 400+ feet SBA with no LOB   Sensory Examination   Sensory Perception no deficits were identified   Coordination   Coordination no deficits were identified   Muscle Tone   Muscle Tone no deficits were identified   General Therapy Interventions   Planned Therapy Interventions balance training;bed mobility training;gait training;strengthening;transfer training;progressive activity/exercise   Clinical Impression   Criteria for Skilled Therapeutic Intervention yes, treatment indicated   PT Diagnosis Decreased activity tolerance   Influenced by the following impairments Decreased activity the last few days, generalized weakness, fatigue   Functional limitations due to impairments Decreased functional independence   Clinical Presentation Stable/Uncomplicated   Clinical Presentation Rationale Pt seems to have returned to baseline following neuro changes   Clinical Decision Making (Complexity) Low complexity   Anticipated Discharge Disposition Home   Risk & Benefits of therapy have been explained Yes   Patient, Family & other staff in agreement with plan of care Yes   [facility] AM-PAC  \"6 Clicks\" V.2 Basic Mobility Inpatient Short Form   1. Turning from your back to your side while in a flat bed without using bedrails? 4 - None   2. Moving from lying on your back to sitting on the side of a flat bed without using bedrails? 4 - None   3. Moving to and from a bed to a chair (including a wheelchair)? 4 - None   4. Standing up from a chair using your arms (e.g., wheelchair, or bedside chair)? 4 - None   5. To walk in " hospital room? 4 - None   6. Climbing 3-5 steps with a railing? 4 - None   Basic Mobility Raw Score (Score out of 24.Lower scores equate to lower levels of function) 24   Total Evaluation Time   Total Evaluation Time (Minutes) 10

## 2017-08-16 NOTE — PROGRESS NOTES
Pt needs a PCP for new stroke, HTN and HLD w/ new meds. Pt doesn't have any insurance listed and admitting notes state that she wanted hospital stay billed to her.  She was admitted yesterday but her stroke symptoms have resolved and she is ready for dc today.    Met w/ pt and family to discuss.  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  [place] but they may have been told she doesn't qualify anymore.  When they tried to apply online and by phone they repeatedly were unable to do so.  They are agreeable to having financial counselor see her to see if he can assist them with this.  Contacted financial counselor Cipriano who will be up in half an hour to assist.  Pt has been to the [facility] in the past and would like to go back there but her previous doctor has since retired.  Will make appt with another MD at the clinic once insurance is known. Updated bedside RN.    Addendum:  Financial counselor has been able to assist in closing out pt's past [place] account so that they can submit the needed financial paperwork to pursue getting unsubsidized insurance.  They do not qualify for subsidized insurance as their income is too high.  The earliest they will be eligible for insurance coverage will be the 1st of October provided they submit their financial information by Sept 15th.  Financial counselor has provided them with the information to do this.  Appt made at [facility] for Monday 8/21 at 2:20.  This will be private pay and they need to bring $150 to that appt and anything over that amount will be billed to them if needed.  New medications are generic (total $37) and will be billed to them as well. They are in agreement with this plan.    Neurology appt should be made in a month but could wait 6 weeks until she has insurance if desired. Neurology clinic will call them to f/u.

## 2017-08-16 NOTE — DISCHARGE SUMMARY
"[facility]    Discharge Summary  Hospitalist    Date of Admission:  8/15/2017  Date of Discharge:  8/16/2017  Discharging Provider: Gurinder Diaz  Date of Service (when I saw the patient): 08/16/17    Discharge Diagnoses   Cerebrovascular accident (CVA), unspecified mechanism (H)    History of Present Illness    I cannot obtain from the patient who is a relatively good historian.  Her sister and her  were also present at the time of my examination and provided some information.       Ms. Fouzia Beltrán is a very pleasant 61-year-old female with past medical history of hypertension, not on any medications and tobacco use disorder who came in for evaluation of right-sided numbness and weakness.  The patient reports that for the last few weeks starting in July, she has had intermittent episodes of left-sided body feeling \"funny.\"  She attributed this to the fact of being dehydrated and each time the symptoms would resolve in a few minutes after she was drinking some water.  This morning she woke up feeling okay.  Noted later around 7:00 a.m. when she started again having that \"funny feeling on the left side of the body including left leg, left arm and left side of the face.  She reports that she felt that she is usually clumsy with her left hand as she was not able to hold her cup as usual.  She again thought that she may be dehydrated.  She drank some water and reportedly she started coughing with drinking water and she felt that water was going down the wrong way.  She felt that she is having a hard time walking.  Her  noticed that around 10:00 a.m. that she was having some left facial droop and some slurred speech.  He thought she might be having a stroke, so he gave her 4 baby aspirin.  She had slurred speech and left facial droop which apparently lasted for a few minutes only then resolved completely.  She continued to have \"a funny feeling on the left side of the body " "throughout the day.  Apparently her sister called her around 4:00 p.m. and her sister thought that she woke the patient up as she was not acting just right\" and because of persistent symptoms, her  convinced her to come to ER for further evaluation.  It seems that after arrival in the ER, her symptoms got much better although on the left side of her body was not completely resolved.  Apparently when she tried to go to the bathroom, she was weak on the left side.       Upon further questioning, the patient reports that in the morning she had a slight headache that now has resolved.  Otherwise, she denies any recent fevers, no chest pain, no palpitations, no shortness of breath, no abdominal pain, no nausea, no vomiting, no diarrhea, no constipation, no dysuria, no leg swelling.       The patient is not aware of having any strokes in the past.       In the ER, the patient was seen by Dr. Paniagua.  Her initial vitals showed blood pressure 193/90, heart rate 90, respiratory rate 16, oxygen saturation 96% on room air, temperature 98.3.  She did have basic blood work which was unremarkable.   BMP, her glucose was 104.  CBC with no leukocytosis, hemoglobin 16.1, normal hematocrit and platelet count.  INR was 0.92.  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  CT angiogram of the head and neck showed no evidence of thrombophlebitis, no stenosis or dissection or aneurysm, but it did show mild atherosclerotic disease involving both carotid bifurcation without any stenosis or dissection.  Her EKG showed normal sinus rhythm at the rate of 80 beats per minute with no ischemic changes.       Because of ongoing stroke-like symptoms Hospitalist Service was called regarding the admission.     Hospital Course   Fouzia Beltrán was admitted on 8/15/2017.  The following problems were addressed during her " hospitalization:    Ms. Fouzia Beltrán is a very pleasant 61-year-old female with past medical history of hypertension, tobacco use disorder and prior history of strokes as per CAT scan findings, although she is not aware of this, who came in for evaluation of left-sided weakness and numbness.      Acute ischemic Stroke. Her  also reported that she had associated slurred speech and left facial droop for a few minutes that has resolved. 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  CT angiogram of the head and neck did not show any acute pathology except mild atherosclerotic disease involving both carotid bifurcation without any stenosis or dissection. MRI head completed.   - Neurology following.   - Statin.   - Antihypertensive regimen initiated.   - ASA initiated.   - Discussed at length with patient and family results and plan of care.   - Close outpatient follow up.      Hypertension.  She reports that doctors told her that she has hypertension in the past, but she was never on medications.    - started on ACE inhibitor.      Tobacco use disorder.  She reports smoking cigars daily.  Nicotine patch had been offered, but she refused it at this time.    - Patient educated to quit smoking.      Code Status   Full Code       Primary Care Physician   [facility]    Physical Exam   Temp: 98.9  F (37.2  C) Temp src: Oral BP: (!) 163/92 (recheck)   Heart Rate: 86 Resp: 16 SpO2: 94 % O2 Device: None (Room air)    Vitals:    08/15/17 2217 08/16/17 0500   Weight: 79.7 kg (175 lb 9.6 oz) 80.6 kg (177 lb 11.1 oz)     Vital Signs with Ranges  Temp:  [98.2  F (36.8  C)-99  F (37.2  C)] 98.9  F (37.2  C)  Heart Rate:  [60-86] 86  Resp:  [11-19] 16  BP: (147-195)/() 163/92  SpO2:  [94 %-98 %] 94 %  I/O last 3 completed shifts:  In: 181 [I.V.:181]  Out: -     GENERAL: Alert  and oriented. NAD. Conversational, appropriate.   HEENT: Normocephalic. EOMI. No icterus or injection. Nares normal.   LUNGS: Clear to auscultation. No dyspnea at rest.   HEART: Regular rate. Extremities perfused.   ABDOMEN: Soft, nontender, and nondistended. Positive bowel sounds.   EXTREMITIES: No LE edema noted.   NEUROLOGIC: Moves extremities x4 on command. No acute focal neurologic abnormalities noted.     Discharge Disposition   Discharged to home  Condition at discharge: Stable    Consultations This Hospital Stay   NEUROLOGY IP CONSULT  PHYSICAL THERAPY ADULT IP CONSULT  OCCUPATIONAL THERAPY ADULT IP CONSULT  SPEECH LANGUAGE PATH ADULT IP CONSULT  SWALLOW EVAL SPEECH PATH AT BEDSIDE IP CONSULT  SMOKING CESSATION PROGRAM IP CONSULT    Time Spent on this Encounter   I, Gurinder Diaz, personally saw the patient today and spent greater than 30 minutes discharging this patient.    Discharge Orders     Reason for your hospital stay   Stroke     Follow-up and recommended labs and tests    Follow up with primary care provider, [facility], within 7 days for hospital follow- up.  The following labs/tests are recommended: cbc/bmp.    Follow up with Neurology as directed     Activity   Your activity upon discharge: activity as tolerated     Full Code     Diet   Follow this diet upon discharge: Orders Placed This Encounter     Room Service     Combination Diet Regular Diet Adult; Thin Liquids (water, ice chips, juice, milk, gelatin, ice cream, etc)       Discharge Medications   Current Discharge Medication List      START taking these medications    Details   aspirin EC 81 MG EC tablet Take 1 tablet (81 mg) by mouth daily  Qty: 30 tablet, Refills: 0    Associated Diagnoses: Cerebrovascular accident (CVA), unspecified mechanism (H)      simvastatin (ZOCOR) 20 MG tablet Take 1 tablet (20 mg) by mouth At Bedtime  Qty: 30 tablet, Refills: 0    Associated Diagnoses: Cerebrovascular accident (CVA),  unspecified mechanism (H)      lisinopril (PRINIVIL/ZESTRIL) 2.5 MG tablet Take 1 tablet (2.5 mg) by mouth daily  Qty: 30 tablet, Refills: 0    Associated Diagnoses: Cerebrovascular accident (CVA), unspecified mechanism (H)         CONTINUE these medications which have NOT CHANGED    Details   multivitamin, therapeutic with minerals (THERA-VIT-M) TABS tablet Take 1 tablet by mouth daily           Allergies   No Known Allergies  Data   Most Recent 3 CBC's:  Recent Labs   Lab Test  08/16/17   0729  08/15/17   1215   WBC  8.6  10.5   HGB  14.4  16.1*   MCV  101*  100   PLT  254  298      Most Recent 3 BMP's:  Recent Labs   Lab Test  08/16/17   0729  08/15/17   1215   NA  142  138   POTASSIUM  4.0  3.6   CHLORIDE  109  103   CO2  23  24   BUN  9  10   CR  0.78  0.76   ANIONGAP  10  11   ELSA  8.4*  9.4   GLC  106*  104*     Most Recent 2 LFT's:  Recent Labs   Lab Test  08/16/17   0729   AST  10   ALT  20   ALKPHOS  51   BILITOTAL  0.9     Most Recent INR's and Anticoagulation Dosing History:  Anticoagulation Dose History     Recent Dosing and Labs Latest Ref Rng & Units 8/15/2017    INR 0.86 - 1.14 0.92        Most Recent 3 Troponin's:  Recent Labs   Lab Test  08/16/17   0030  08/15/17   1705   TROPI  <0.015  <0.015     Most Recent Cholesterol Panel:  Recent Labs   Lab Test  08/15/17   1705   CHOL  242*   LDL  127*   HDL  78   TRIG  186*     Most Recent 6 Bacteria Isolates From Any Culture (See EPIC Reports for Culture Details):No lab results found.  Most Recent TSH, T4 and A1c Labs:  Recent Labs   Lab Test  08/15/17   1705   TSH  1.69   A1C  5.2     Results for orders placed or performed during the hospital encounter of 08/15/17   CT Head w/o Contrast    Narrative    CT SCAN OF THE HEAD WITHOUT CONTRAST August 15, 2017 12:45 PM     HISTORY: Left-sided numbness. Earlier had slurred speech, facial  droop.    TECHNIQUE: Axial images of the head and coronal reformations without  IV contrast material. Radiation dose for this  scan was reduced using  automated exposure control, adjustment of the mA and/or kV according  to patient size, or iterative reconstruction technique.    COMPARISON: None.    FINDINGS: There is a low-density area in the left paramedian shellie  consistent with an old infarct. There is also a low-density area in  the inferior right occipital lobe consistent with an old infarct. The  brain parenchyma is otherwise normal. There is no evidence for  intracranial hemorrhage, mass effect, acute infarct, or skull  fracture.      Impression    IMPRESSION: Old left paramedian shellie and right occipital lobe infarct.  No evidence for intracranial hemorrhage or any acute process.    ANGY CARROLL MD   CT Head Neck Angio w/o & w Contrast    Narrative    CTA ANGIOGRAM HEAD NECK 8/15/2017 12:46 PM     HISTORY: Left-sided numbness and slurred speech.    TECHNIQUE: Axial images were obtained through the head and neck  without and with intravenous contrast. 70 mL of Isovue-370 was given.  Multiplanar reconstructions were performed. 3-D reconstructions off a  remote workstation for CT angiography were also acquired. Radiation  dose for this scan was reduced using automated exposure control,  adjustment of the mA and/or kV according to patient size, or iterative  reconstruction technique.    FINDINGS:   Brachiocephalic vessels: Normal.    Right carotid system: Minimal plaque. No evidence for stenosis or  dissection.    Left carotid system: Minimal plaque. No evidence for stenosis or  dissection.    Right vertebral artery: Normal.    Left vertebral artery: Normal.    [place]: Normal.    Other findings: Postcontrast images through the brain do not show any  abnormalities. Images through the neck are unremarkable as visualized.      Impression    IMPRESSION:  1. Mild atherosclerotic disease involving both carotid bifurcations  without any stenosis or dissection.  2. No evidence for thromboembolism, stenosis, dissection, or  aneurysm.    ANGY CARROLL MD   MR Brain w/o Contrast    Narrative    MR BRAIN WITHOUT CONTRAST 8/15/2017 9:56 PM    HISTORY: Acute CVI    COMPARISON: None.    TECHNIQUE: Fast scanning technique due to claustrophobia. No contrast  used.    FINDINGS: There is a 2.4 cm area of old infarction in the right  occipital lobe. There is a small area of old infarction in the right  central shellie. There is a tiny area of high signal on T2 diffusion scan  in the right central shellie adjacent to the old infarct. This is seen on  series 502 image 9. It could represent a very tiny new right pontine  infarct. Tiny area of white matter hyperintensity right posterior  parietal lobe likely old ischemic event and tiny area of high T2  signal in the left peripheral cerebellar cortex, likely also due to a  small area of old infarction.    Ventricles are normal in size and position. No hemorrhage. Sinuses are  clear.      Impression    IMPRESSION:  1. Old infarcts in the left central shellie, peripheral left cerebellum,  right occipital lobe and right parietal lobe.  2. Questionable tiny new area of recent infarction on the diffusion  scan in the right central shellie.  3. No hemorrhage.    SOBEIDA MORTENSEN MD

## 2017-08-16 NOTE — PLAN OF CARE
Problem: Goal Outcome Summary  Goal: Goal Outcome Summary  Outcome: No Change  A&O. Anxious, tearful, PRN ativan given before and during MRI. MRI pending. Neuros intact ex tongue left midline shift. DD3 with nectar thick. Tolerating diet. SBA. Tele Sinus shavonne. Voiding adequately in BR. D/C pending.

## 2017-08-16 NOTE — DISCHARGE INSTRUCTIONS
Your risk factors for stroke or TIA (transient ischemic attack):    Your Risk Factors Your Results Normal Ranges   High blood pressure BP Readings from Last 1 Encounters:   08/16/17 (!) 163/92    Less than 120/80   Cholesterol              Total Lab Results   Component Value Date    CHOL 242 08/15/2017      Less than 150    Triglycerides   Lab Results   Component Value Date    TRIG 186 08/15/2017    Less than 150   LDL Lab Results   Component Value Date     08/15/2017       Less than 70   HDL Lab Results   Component Value Date    HDL 78 08/15/2017            Greater than 40 (men)  Greater than 50 (women)   Diabetes   Recent Labs  Lab 08/16/17  0729   *    Fasting blood glucose    Smoking/tobacco use  Quit smoking and tobacco   Overweight  Lose 1-2 pounds a week   Lack of exercise  30 minutes moderate activity each day   Other risk factors include carotid (neck) artery disease, atrial fibrillation and stress. You may be on new medicine to treat high blood pressure, cholesterol, diabetes or atrial fibrillation.    Understanding Stroke Booklet given to patient. Please refer to booklet for further information.    Stroke warning signs and symptoms - CALL 911 right away for:  - Sudden numbness or weakness in the face, arm or leg (often on one side of the body).  - Sudden confusion or trouble understanding what is going on.  - Sudden blurred or decreased vision in one or both eyes.  - Sudden trouble speaking, loss of balance, dizziness or problems with coordination.  - Sudden, severe headache for no reason.  - Fainting or seizures.  - Symptoms may go away then come back suddenly.

## 2017-08-16 NOTE — PLAN OF CARE
Problem: Goal Outcome Summary  Goal: Goal Outcome Summary  Discharge Planner SLP   Patient plan for discharge: Home with assistance.  Current status: Minimal to mild oral/pharyngeal dysphagia with improvement noted in her swallow function. She was able to tolerate thin liquids via the cup without overt Sx of aspiration. She demonstrated good mastication of solids and oral clearing. No overt Sx of aspiration on snack given. Recommend: 1. Advance diet to regular textures and thin liquids. 2. Upright, no straws, small bites/sips, alternate liquids/solids. 3. SLP will f/u for one mores session to ensure diet tolerance and swallow strategies.   Barriers to return to prior living situation: None  Recommendations for discharge: Home with assistance.  Rationale for recommendations: Will not need ST after hospital stay.        Entered by: Nevin Sesay 08/16/2017 10:35 AM

## 2017-08-16 NOTE — CONSULTS
Note is dictated. Pt. Presents with acute stroke. I rec ASA 81 mg PO QD, treatment of HTN and hypercholesterolemia. Ok to d/c when appropriate. I will follow up in 1 mo in a clinic

## 2017-08-16 NOTE — PLAN OF CARE
Problem: Goal Outcome Summary  Goal: Goal Outcome Summary  Physical Therapy Discharge Summary     Reason for therapy discharge:    All goals and outcomes met, no further needs identified.     Progress towards therapy goal(s). See goals on Care Plan in [facility] electronic health record for goal details.  Goals met     Therapy recommendation(s):    No further therapy is recommended.

## 2017-08-16 NOTE — PROVIDER NOTIFICATION
"Paged Joe, \"Ok to start BP meds per neuro. Pt wondering if she will be able to discharge today. Thanks\"  "

## 2017-08-16 NOTE — CONSULTS
CHIEF COMPLAINT:  Evaluate for left-sided weakness and acute stroke.      HISTORY OF PRESENT ILLNESS:  Fouzia Beltrán is a 61-year-old right-handed woman with risk factors of untreated hypertension, untreated hyperlipidemia and smoking who presents with slurred speech and left-sided sensory changes, left facial droop which occurred yesterday at about 10:00 a.m. and resolved completely.  Within a month, she had another episode of left-sided weakness which lasted for some time and resolved.  She felt that she was dehydrated.  The patient does not have a primary care physician and has not been seen by a physician for a number of years.  Her family is present in her room.  MRI of the brain was done which showed right pontine stroke and a recent right occipital infarct likely related to atherosclerotic disease.  CT angiogram and CT were done through the emergency room which did not show any bleed and no evidence of hemodynamically significant stenosis in the arteries of the neck or brain.      Since admission, the patient has dyslipidemia and elevated high blood pressure.  She has not been treated for any of those issues before.  The patient's deficits have improved.      The patient currently reports that she has no symptoms.      PAST MEDICAL HISTORY:  None per patient, but likely patient has hypertension, hyperlipidemia prior to this admission.      RISK FACTORS FOR STROKE:  Family history of stroke and cardiovascular disease, dyslipidemia, smoking and hypertension.      ALLERGIES:  None.      SOCIAL HISTORY:  The patient is a smoker.  She is retired, , lives with her .      MEDICATIONS:  On admission, none.  Since admission to the hospital, aspirin was started.      REVIEW OF ORGANS AND SYSTEMS:  A hospital workup was performed and showed hemoglobin A1c of 5.2.  Cholesterol of 242, triglycerides 186, LDL of 127.  Blood pressure is elevated.  The patient does not endorse any other abnormal symptoms and the  remainder of the organs and systems review negative to all other systems.      CURRENT PHYSICAL EXAMINATION:   GENERAL:  The patient is in no acute distress.   VITAL SIGNS:  Blood pressure 153/92, temperature 98, heart rate 86 and regular.  No atrial fibrillation is seen.   PULMONARY:  Respiratory rate is 16.  Lungs are clear to auscultation bilaterally.   CARDIOVASCULAR:  S1, S2 cardiac sounds with no murmurs or bruits.   EXTREMITIES:  Warm.  Pedal pulses are present.  There is no evidence of pedal edema.   NEUROLOGIC:  The patient is awake and alert x3 with clear speech and language functions.  Pupils are equal and reactive to light.  Extraocular eye movements in the full range with no nystagmus.  Face appears to be symmetrical.  The remainder of the cranial nerve exam II-XII are intact bilaterally.  Motor examination shows symmetrical bulk and tone, no evidence of drift.  Full strength in both upper and lower extremities is present.  Reflexes are symmetrical.  Ankle jerk reflexes are present.  Toes are downgoing.  Sensory exam is intact to light touch, pinprick, vibration and joint position sensation.  Coordination is intact to finger-nose-finger and heel-to-shin.  Gait is not checked.      DIAGNOSTIC DATA:  MRI study of the brain is reviewed as mentioned above.  Echocardiogram was performed, ejection fraction of 55%, no clot is seen.  Bubble study is negative.  I reviewed labs.      IMPRESSION:  This lady presents with stroke in the right shellie due to atherosclerotic mechanism related to untreated hypertension, hyperlipidemia, family history of stroke and smoking.      PLAN:  Would be to start baby aspirin.  I would recommend treatment of hypertension and dyslipidemia.      I strongly advised the patient to quit smoking and be compliant with medications.  She can be discharged when medically appropriate today or tomorrow.  It does not look like she has any deficits and requires any rehab.  It is very important  for her to establish care with primary physician and follow up within 1 week.  She will follow up with me in Neurology Clinic in 1 month.      I discussed all the above with patient and her family.  They are in agreement with the plan.         STELLA HUI MD             D: 2017 13:39   T: 2017 14:11   MT: OWEN      Name:     KATRIN ELY   MRN:      [id number]        Account:       [id number]   :      1955           Consult Date:  2017      Document: [id number]

## 2017-08-16 NOTE — PROGRESS NOTES
[facility]  Hospitalist Progress Note        Gurinder Diaz, DO  08/16/2017        Interval History:      Patient reports resolution of stroke symptoms this am. Discussed results of MRI with patient and daughter at bedside, including plan of care, verbalized understanding.          Assessment and Plan:        Ms. Fouzia Beltrán is a very pleasant 61-year-old female with past medical history of hypertension, tobacco use disorder and prior history of strokes as per CAT scan findings, although she is not aware of this, who came in for evaluation of left-sided weakness and numbness.     Acute ischemic Stroke. Her  also reported that she had associated slurred speech and left facial droop for a few minutes that has resolved. 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  CT angiogram of the head and neck did not show any acute pathology except mild atherosclerotic disease involving both carotid bifurcation without any stenosis or dissection. MRI head completed.   - Neurology following.   - Telemetry.   - Permissive hypertension.   - Telemetry.   - ASA  - NS IVF.     Hypertension.  She reports that doctors told her that she has hypertension in the past, but she was never on medications.    - She will need to be started on some antihypertensive medications, consider Norvasc, beta blocker or ACE inhibitor.     Tobacco use disorder.  She reports smoking cigars daily.  Nicotine patch had been offered, but she refused it at this time.    - Patient educated to quit smoking.     Deep venous thrombosis prophylaxis.  Pneumatic compression device.       CODE: full code.       DISPOSITION: Discharge in 1-2 days pending workup and clearance from Neurology.                    Physical Exam:      Heart Rate: 65    Blood pressure (!) 171/103, pulse 86, temperature 99  F (37.2  C),  "resp. rate 16, height 1.626 m (5' 4\"), weight 80.6 kg (177 lb 11.1 oz), SpO2 95 %.    Vitals:    08/15/17 2217 17 0500   Weight: 79.7 kg (175 lb 9.6 oz) 80.6 kg (177 lb 11.1 oz)       Vital Sign Ranges  Temperature Temp  Av.4  F (36.9  C)  Min: 98.1  F (36.7  C)  Max: 99  F (37.2  C)   Blood pressure Systolic (24hrs), Av , Min:147 , Max:205        Diastolic (24hrs), Av, Min:78, Max:112      Pulse Pulse  Av  Min: 86  Max: 86   Respirations Resp  Av.9  Min: 11  Max: 26   Pulse oximetry SpO2  Av.9 %  Min: 95 %  Max: 100 %     Vital Signs with Ranges  Temp:  [98.1  F (36.7  C)-99  F (37.2  C)] 99  F (37.2  C)  Pulse:  [86] 86  Heart Rate:  [60-90] 65  Resp:  [11-26] 16  BP: (147-205)/() 171/103  SpO2:  [95 %-100 %] 95 %    I/O Last 3 Shifts:   I/O last 3 completed shifts:  In: 181 [I.V.:181]  Out: -     I/O past 24 hours:     Intake/Output Summary (Last 24 hours) at 17 0911  Last data filed at 08/15/17 2225   Gross per 24 hour   Intake              181 ml   Output                0 ml   Net              181 ml     GENERAL: Alert and oriented. NAD. Conversational, appropriate.   HEENT: Normocephalic. EOMI. No icterus or injection. Nares normal.   LUNGS: Clear to auscultation. No dyspnea at rest.   HEART: Regular rate. Extremities perfused.   ABDOMEN: Soft, nontender, and nondistended. Positive bowel sounds.   EXTREMITIES: No LE edema noted.   NEUROLOGIC: Moves extremities x4 on command. No acute focal neurologic abnormalities noted.          Prior to Admission Medications:        Prescriptions Prior to Admission   Medication Sig Dispense Refill Last Dose     multivitamin, therapeutic with minerals (THERA-VIT-M) TABS tablet Take 1 tablet by mouth daily   2017 at Unknown time            Medications:        Current Facility-Administered Medications   Medication Last Rate     - MEDICATION INSTRUCTIONS -       NaCl 100 mL/hr at 17 0501     Current Facility-Administered " Medications   Medication Dose Route Frequency     aspirin EC  81 mg Oral Daily     sodium chloride (PF)  3 mL Intracatheter Q8H     Current Facility-Administered Medications   Medication Dose Route Frequency     - MEDICATION INSTRUCTIONS -   Does not apply Continuous PRN     labetalol  10-40 mg Intravenous Q10 Min PRN     hydrALAZINE  10-20 mg Intravenous Q1H PRN     naloxone  0.1-0.4 mg Intravenous Q2 Min PRN     lidocaine (buffered or not buffered)  1 mL Other Q1H PRN     lidocaine 4%   Topical Q1H PRN     sodium chloride (PF)  3 mL Intracatheter Q1H PRN     acetaminophen  650 mg Oral Q4H PRN     acetaminophen  650 mg Rectal Q4H PRN     senna-docusate  1-2 tablet Oral BID PRN     ondansetron  4 mg Oral Q6H PRN    Or     ondansetron  4 mg Intravenous Q6H PRN     LORazepam  0.5 mg Oral Q8H PRN            Data:      Lab data reviewed.     Recent Labs  Lab 08/16/17  0729 08/16/17  0030 08/15/17  1705 08/15/17  1215   HGB 14.4  --   --  16.1*   *  --   --  100     --   --  298   INR  --   --   --  0.92     --   --  138   POTASSIUM 4.0  --   --  3.6   CHLORIDE 109  --   --  103   CO2 23  --   --  24   BUN 9  --   --  10   CR 0.78  --   --  0.76   ANIONGAP 10  --   --  11   ELSA 8.4*  --   --  9.4   *  --   --  104*   TROPI  --  <0.015 <0.015  --            Imaging:      Imaging data reviewed.     Dr. Gurinder Diaz D.O.  [facility] Hospitalist  Pager [phone]

## 2017-08-16 NOTE — PLAN OF CARE
Problem: Goal Outcome Summary  Goal: Goal Outcome Summary  Discharge Planner PT       PT orders received, Evaluation complete, treatment initiated. Pt is a 60 yo female who was admitted on 8/15/17 with right sided weakness and numbness. Pt lives at home with her  and has 2 stairs to enter with railing on right. Pt was previously independent with all ADL's and IADL's.     Patient plan for discharge: Return home  Current status: Pt is independent with all bed mobility and transfers. Pt ambulated 400+ ft SBA for help with IV pole demonstrating good stride length and gait speed. Pt able to ascend/descend 3 stairs with no use of railing and recipricol gait pattern,SBA for management of IV pole. Pt requires no further PT services and order completed.  Barriers to return to prior living situation: None  Recommendations for discharge: Return home   Rationale for recommendations: See above       Entered by: Chaparro Graham 08/16/2017 9:48 AM

## 2017-08-16 NOTE — PROGRESS NOTES
L pupil slightly larger R-- both reactive & brisk. No double vision or blurred vision. Will continue to monitor. Dr. Huynh to round today.

## 2017-08-17 NOTE — PLAN OF CARE
Problem: Goal Outcome Summary  Goal: Goal Outcome Summary  Speech Language Therapy Discharge Summary     Reason for therapy discharge:    Discharged to home.     Progress towards therapy goal(s). See goals on Care Plan in [facility] electronic health record for goal details.  Goals met     Therapy recommendation(s):    No further therapy is recommended. Discharged on a regular diet with thin liquids. No speech/language deficits.

## 2017-08-30 PROBLEM — E78.2 MIXED HYPERLIPIDEMIA: Status: ACTIVE | Noted: 2017-01-01

## 2017-08-30 PROBLEM — I10 HTN (HYPERTENSION): Status: ACTIVE | Noted: 2017-01-01

## 2017-08-30 PROBLEM — I63.9 CVA (CEREBRAL VASCULAR ACCIDENT) (H): Status: ACTIVE | Noted: 2017-01-01

## 2017-08-30 NOTE — IP AVS SNAPSHOT
[address], [address]    [facility] [address]    Phone:  [phone]                                       After Visit Summary   8/30/2017    Fouzia Beltrán    MRN: [id number]           After Visit Summary Signature Page     I have received my discharge instructions, and my questions have been answered. I have discussed any challenges I see with this plan with the nurse or doctor.    ..........................................................................................................................................  Patient/Patient Representative Signature      ..........................................................................................................................................  Patient Representative Print Name and Relationship to Patient    ..................................................               ................................................  Date                                            Time    ..........................................................................................................................................  Reviewed by Signature/Title    ...................................................              ..............................................  Date                                                            Time

## 2017-08-30 NOTE — ED PROVIDER NOTES
"  History     Chief Complaint:  Weakness      The history is provided by the EMS personnel and the spouse.      Fouzia Beltrán is a 61 year old female who presents via EMS with weakness. Patient's  states that the patient was evaluated in the ED several weeks ago for left sided weakness. Patient was discharged to home with lisinopril. Since her discharge patient has complained of dizziness. She woke up this morning at 0700 and was once again complaining of dizziness, her  notes she was looking off to the right a lot, though she had clear speech. Upon EMS arrival she was noted to have a right facial droop, and difficulty speaking. Upon her arrival here patient has increased speech difficulty with right arm numbness.     Allergies:  No known drug allergies      Medications:    Aspirin  Zocor  Lisinopril    Past Medical History:    The patient does not have any past pertinent medical history.     Past Surgical History:    History reviewed. No pertinent surgical history.     Family History:    History reviewed. No pertinent family history.      Social History:  Presents via EMS   Tobacco use: current some day smoker  Alcohol use: yes  PCP: Missy Orta    Marital Status:        Review of Systems   Unable to perform ROS: Acuity of condition     Physical Exam     Patient Vitals for the past 24 hrs:   BP Temp Temp src Pulse Heart Rate Resp SpO2 Height Weight   08/30/17 0901 143/82 - - 61 - - 97 % - -   08/30/17 0858 142/86 - - 59 - - 97 % - -   08/30/17 0839 135/82 - - 60 - - - - -   08/30/17 0807 171/90 98.2  F (36.8  C) Temporal - 62 16 99 % 1.626 m (5' 4\") 79.4 kg (175 lb)   08/30/17 0806 - - - - - - 98 % - -   08/30/17 0805 - - - - - - 99 % - -   08/30/17 0804 171/90 - - - - - - - -           Physical Exam  GENERAL: well developed, pleasant  HEAD: atraumatic  EYES: pupils reactive, extraocular muscles intact, conjunctivae normal, disconjugate eyes  ENT:  mucus membranes moist  NECK:  trachea " midline, normal range of motion  RESPIRATORY: no tachypnea, breath sounds clear to auscultation   CVS: normal S1/S2, no murmurs, intact distal pulses  ABDOMEN: soft, nontender, nondistention  MUSCULOSKELETAL: no deformities  SKIN: warm and dry, no acute rashes or ulceration  NEURO: moderate slurred speech, gaze preference to the right, unable to use right arm or right leg, disconjugate eyes, weakness to right facial grimace  PSYCH:  tearful    Emergency Department Course   Imaging:  Radiographic findings were communicated with the patient and family who voiced understanding of the findings.    CT Head w/o Contrast:   IMPRESSION:   1. Atrophy and old infarcts as described.  2. Nothing acute.  3. CT angiogram to follow. 4 no interval change.    SOBEIDA MORTENSEN MD     CT Head w/ Contrast:   IMPRESSION: Brainstem and bithalamic perfusion defects.     CTA Angiogram Head Neck:   Pending    MR Brain w/o and w Contrast:   Pending      Results per radiology.     Laboratory:  CBC: WBC 11.8 (H) o/w WNL (HGB 15.3, )    BMP: Glucose 115 (H) o/w WNL (Creatinine 0.80)   INR: 0.96   Partial Thromboplastin Time: 32     Interventions:  0825: Saline 100 mL IV    Emergency Department Course:  Past medical records, nursing notes, and vitals reviewed.  0804: I performed an exam of the patient and obtained history, as documented above.  Code Stroke was called.   IV inserted and blood drawn.   Above interventions provided.   The patient was sent for a head CT, a head/neck CTA, and a brain MRI while in the emergency department, findings above.   I personally reviewed the laboratory results with the Patient and spouse and daughter and answered all related questions prior to admission.  Findings and plan explained to the Patient and spouse and daughter who consents to admission.   0839: Discussed the patient with Dr. Hinton, who will admit the patient to an ICU bed for further monitoring, evaluation, and treatment.        Impression &  Plan    CMS Diagnoses: The patient has stroke symptoms:           ED Stroke specific documentation           NIHSS PDF          Protocol PDF     Patient last known well time: 0700  ED Provider first to bedside at: 0804    Patient was not treated with TPA due to the following reason(s):  Major stroke or head trauma within 3 months or minor stroke within 1 month      [employer] Stroke Scale (Baseline)  Time Performed: 0807      Score    Level of consciousness: (0)   Alert, keenly responsive    LOC questions: (0)   Answers both questions correctly    LOC commands: (0)   Performs both tasks correctly    Best gaze: (1)   Partial gaze palsy    Visual: (1)   Partial hemianopia    Facial palsy: (2)   Partial paralysis (total/near total of lower face)    Motor arm (left): (0)   No drift    Motor arm (right): (3)   No effort against gravity    Motor leg (left): (0)   No drift    Motor leg (right): (3)   No effort against gravity    Limb ataxia: (0)   Absent    Sensory: (0)   Normal- no sensory loss    Best language: (2)   Severe aphasia    Dysarthria: (2)   Severe dysaphria    Extinction and inattention: (0)   No abnormality        Total Score:  14        Stroke Mimics were considered (including migraine headache, seizure disorder, hypoglycemia (or hyperglycemia), head or spinal trauma, CNS infection, Toxin ingestion and shock state (e.g. sepsis) .  Medical Decision Making:  Fouzia Beltrán is a 61 year old female who presents with concerns for stroke. Patient was recently admitted and discharged for stroke. Patient is having difficulty with speech, right arm and leg weakness, and disconjugate gaze. Code stroke was called. Patient is not a TPA candidate given that she has recently had a stroke within the last 3 months and there is no interventional radiology findings on imaging. Dr. Lo has ordered an MRI, which is pending. Patient will be admitted for ongoing care.     Critical Care time:  was 20 minutes  for this patient excluding procedures.    Diagnosis:    ICD-10-CM   1. Cerebrovascular accident (CVA), unspecified mechanism (H) I63.9       Disposition:   Admitted to an ICU bed.       8/30/2017    EMERGENCY DEPARTMENT  [facility] am serving as a scribe at 8:04 AM on 8/30/2017 to document services personally performed by Emanuel Leal MD based on my observations and the provider's statements to me.       Emanuel Leal MD  08/30/17 1400       Emanuel Leal MD  08/30/17 2776

## 2017-08-30 NOTE — IP AVS SNAPSHOT
"[place] [address] ONCOLOGY: [phone]                                              INTERAGENCY TRANSFER FORM - PHYSICIAN ORDERS   2017                    Hospital Admission Date: 2017  KATRIN ELY   : 1955  Sex: Female        Attending Provider: Dottie Hinton DO     Allergies:  No Known Allergies    Infection:  None   Service:  HOSPITALIST    Ht:  1.626 m (5' 4\")   Wt:  85.5 kg (188 lb 7.9 oz)   Admission Wt:  79.4 kg (175 lb)    BMI:  32.35 kg/m 2   BSA:  1.96 m 2            Patient PCP Information     Provider PCP Type    Missy Orta [facility]      ED Clinical Impression     Diagnosis Description Comment Added By Time Added    Cerebrovascular accident (CVA), unspecified mechanism (H) [I63.9] Cerebrovascular accident (CVA), unspecified mechanism (H) [I63.9]  Daria Cooper, SHUKRI CNP 2017  8:25 AM    Acute ischemic VBA brainstem stroke, unspecified laterality (H) [I63.219, I63.22] Acute ischemic VBA brainstem stroke, unspecified laterality (H) [I63.219, I63.22]  Cm Lo MD 2017  9:35 AM    Mixed hyperlipidemia [E78.2] Mixed hyperlipidemia [E78.2]  Cm Lo MD 2017  9:35 AM    Tobacco abuse [Z72.0] Tobacco abuse [Z72.0]  Cm Lo MD 2017  9:36 AM    History of stroke [Z86.73] History of stroke [Z86.73]  Cm Lo MD 2017  9:36 AM      Hospital Problems as of 9/3/2017              Priority Class Noted POA    CVA (cerebral vascular accident) (H) Medium  2017 Yes    HTN (hypertension) Medium  2017 Yes    Mixed hyperlipidemia Medium  2017 Yes      Non-Hospital Problems as of 9/3/2017              Priority Class Noted    Left-sided weakness Medium  8/15/2017      Code Status History     Date Active Date Inactive Code Status Order ID Comments User Context    9/3/2017 10:37 AM  DNR/DNI [id number]  Roddy Phipps MD Outpatient    2017  8:16 AM 9/3/2017 10:37 AM DNR/DNI [id number] " Place note in progress notes. Code status discussion is appropriately documented in the chart. Cm Lo MD Inpatient    8/31/2017  3:32 PM 9/1/2017  8:15 AM DNR [id number]  Cm Lo MD Inpatient    8/30/2017  3:52 PM 8/31/2017  3:32 PM Full Code [id number]  Marily Lanier APRN CNP Inpatient    8/30/2017  3:35 PM 8/30/2017  3:52 PM Full Code [id number]  Bart Marc PA-C Inpatient    8/16/2017  1:55 PM 8/30/2017  3:35 PM Full Code [id number]  Gurinder Diaz DO Outpatient    8/15/2017  4:10 PM 8/16/2017  1:55 PM Full Code [id number]  Breann James MD Inpatient         Medication Review      START taking        Dose / Directions Comments    acetaminophen 32 mg/mL solution   Commonly known as:  TYLENOL   Used for:  Cerebrovascular accident (CVA), unspecified mechanism (H)        Dose:  650 mg   Take 20.3 mLs (650 mg) by mouth every 4 hours as needed for fever or mild pain   Quantity:  237 mL   Refills:  1        atropine 1 % ophthalmic solution   Used for:  Cerebrovascular accident (CVA), unspecified mechanism (H)        Dose:  2-4 drop   Take 2-4 drops by mouth, place under tongue or place inside cheek every 2 hours as needed for other (terminal respiratory secretions) Not for ophthalmic use.   Quantity:  5 mL   Refills:  1        bisacodyl 10 MG Suppository   Commonly known as:  DULCOLAX   Used for:  Cerebrovascular accident (CVA), unspecified mechanism (H)        Unwrap and insert 1 suppository rectally twice daily as needed for constipation.   Quantity:  12 suppository   Refills:  1        haloperidol 2 MG/ML (HIGH CONC) solution   Commonly known as:  HALDOL   Used for:  Cerebrovascular accident (CVA), unspecified mechanism (H)        Dose:  1-2 mg   Take 0.5-1 mLs (1-2 mg) by mouth, place under tongue or insert rectally every 6 hours as needed for agitation (nausea)   Quantity:  30 mL   Refills:  1        LORazepam 2 MG/ML (HIGH CONC) solution   Commonly known as:  ATIVAN    Used for:  Cerebrovascular accident (CVA), unspecified mechanism (H)        Dose:  0.5-1 mg   Take 0.25-0.5 mLs (0.5-1 mg) by mouth, place under tongue or insert rectally every 4 hours as needed for anxiety (restlessness)   Quantity:  30 mL   Refills:  1        MEDICATION INSTRUCTION   Used for:  Cerebrovascular accident (CVA), unspecified mechanism (H)        If care facility cannot accept or use ranges, facility is instructed to use lower end of dosing range   Refills:  0        morphine 2.5 MG solu-tab   Used for:  Cerebrovascular accident (CVA), unspecified mechanism (H)        Dose:  2.5 mg   Place 1 tablet (2.5 mg) under the tongue every 2 hours as needed for moderate to severe pain (and/or shortness of breath.)   Quantity:  30 tablet   Refills:  0          STOP taking     aspirin 81 MG EC tablet           lisinopril 2.5 MG tablet   Commonly known as:  PRINIVIL/Zestril           multivitamin, therapeutic with minerals Tabs tablet           simvastatin 20 MG tablet   Commonly known as:  ZOCOR                   Summary of Visit     Reason for your hospital stay       For evaluation of stroke and enrollment in hospice care.             After Care     Activity       Your activity upon discharge: Bedrest       Diet       Follow this diet upon discharge: Eat and drink for comfort or pleasure             Referrals     HOSPICE REFERRAL       **Order classes of: [facility], [facility] and [facility] will route to the Home Care and Hospice Referral Pool.  Home Care or Hospice will then contact the patient to schedule their appointment.**    If you do not hear from Home Care and Hospice, or you would like to call to schedule, please call the referring place of service that your provider has listed below.  ______________________________________________________________________    Your provider has referred you to: [place]: [facility] [place] [phone]    [email or web address]    Extended Emergency Contact Information  Primary Emergency Contact: Juancho Davila   [facility]  Home Phone: [phone]  Mobile Phone: [phone]  Relation: Spouse  Secondary Emergency Contact: Lois   [place]  Mobile Phone: [phone]  Relation: Daughter    Patient Anticipated Discharge Date: 9/3/2017   RN, PT, HHA to begin 24 - 48 hours after discharge.  PLEASE EVALUATE AND TREAT (Evaluation timeline is 24 - 48 hrs. Please call if there is need for a variance to this timeline).    REASON FOR REFERRAL: Hospice - Diagnosis: Stroke    ADDITIONAL SERVICES NEEDED:     OTHER PERTINENT INFORMATION: Patient was last seen by provider on 9/3/2017 for stroke.    No current outpatient prescriptions on file.    Patient Active Problem List:     Left-sided weakness     CVA (cerebral vascular accident) (H)     HTN (hypertension)     Mixed hyperlipidemia      Documentation of Face to Face and Certification for Home Health Services    I certify that patient, Fouzia Beltrán is under my care and that I, or a Nurse Practitioner or Physician's Assistant working with me, had a face-to-face encounter that meets the physician face-to-face encounter requirements with this patient on: 9/3/2017.    This encounter with the patient was in whole, or in part, for the following medical condition, which is the primary reason for Home Health Care: Stroke.    I certify that, based on my findings, the following services are medically necessary Home Health Services: home hospice care for stroke    My clinical findings support the need for the above services because: Nurse is needed: home hospice  Further, I certify that my clinical findings support that this patient is homebound (i.e. absences from home require considerable and taxing effort and are for medical reasons or [employer]ism services or infrequently or of short duration when for other reasons) because: Patient is bedbound due to:  stroke.    Based on the above findings, I certify that this patient is confined to the home and needs intermittent skilled nursing care, physical therapy and/or speech therapy.  The patient is under my care, and I have initiated the establishment of the plan of care.  This patient will be followed by a physician who will periodically review the plan of care.    Physician/Provider to provide follow up care: Missy Orta certified Physician at time of discharge: Moise    Please be aware that coverage of these services is subject to the terms and limitations of your health insurance plan.  Call member services at your health plan with any benefit or coverage questions.       Home care nursing referral       Home hospice services    Your provider has ordered home care nursing services. If you have not been contacted within 2 days of your discharge please call the inpatient department phone number at [phone] .              MD face to face encounter       Documentation of Face to Face and Certification for Home Health Services    I certify that patient: Fouzia Beltrán is under my care and that I, or a nurse practitioner or physician's assistant working with me, had a face-to-face encounter that meets the physician face-to-face encounter requirements with this patient on: 9/3/2017.    This encounter with the patient was in whole, or in part, for the following medical condition, which is the primary reason for home health care: Stroke.    I certify that, based on my findings, the following services are medically necessary home health services: Nursing.    My clinical findings support the need for the above services because: Nurse is needed: home hospice for end of life care    Further, I certify that my clinical findings support that this patient is homebound (i.e. absences from home require considerable and taxing effort and are for medical reasons or [employer] orthodox services or infrequently or of  short duration when for other reasons) because: Patient is bedbound due to: stroke..    Based on the above findings. I certify that this patient is confined to the home and needs intermittent skilled nursing care, physical therapy and/or speech therapy.  The patient is under my care, and I have initiated the establishment of the plan of care.  This patient will be followed by a physician who will periodically review the plan of care.  Physician/Provider to provide follow up care: Missy Orta    Attending hospital physician (the Medicare certified [facility] provider): Roddy Phipps  Physician Signature: See electronic signature associated with these discharge orders.  Date: 9/3/2017                  Follow-Up Appointment Instructions     Future Labs/Procedures    Follow-up and recommended labs and tests      Comments:    Follow up with [facility] tomorrow.      Follow-Up Appointment Instructions     Follow-up and recommended labs and tests        Follow up with [facility] tomorrow.             Statement of Approval     Ordered          09/03/17 1040  I have reviewed and agree with all the recommendations and orders detailed in this document.  EFFECTIVE NOW     Approved and electronically signed by:  Roddy Phipps MD

## 2017-08-30 NOTE — H&P
PRIMARY CARE PHYSICIAN:   Missy Orta MD      CHIEF COMPLAINT:  Right upper extremity weakness and aphasia.      HISTORY OF PRESENT ILLNESS:  Fouzia Beltrán is a 61-year-old female with past medical history of hypertension, hyperlipidemia, tobacco abuse disorder as well as recent admission at [facility] from the dates of 08/15 through 08/16/2017 due to CVA who presented to the Emergency Department this morning for evaluation of right upper extremity weakness and slight aphasia.  The patient during recent admission was found to have any acute CVA in the right shellie as well as identified old CVAs per CT in the left paramedian shellie as well as right occipital lobe which patient was unaware of prior.  The patient was evaluated by Neurology as well as Hospitalist Service.  She was not found to have any deficits requiring physical therapy needs.  She had an echocardiogram with bubble study which was negative.  Workup was significant for hyperlipidemia as well as untreated hypertension for which the patient was started on a statin as well as low-dose lisinopril.  Since discharge, patient, patient's  as well as family members report that she has been persistently dizzy and slightly unsteady on her feet.  She has been compliant with her medications.  They do have a blood pressure cuff at home and have been measuring her blood pressures and report that they have been well controlled.  They did see the patient's primary neurologist, Dr. Lo, in clinic last week and did note the dizziness at that time; however, it was felt to be related to patient's known CVA.  Patient was otherwise stable up until this morning where upon waking, she had sudden onset of worsening of her dizziness and appeared to be looking off to the right quite a large deal, with clear speech.   reports that patient got up and ambulated to the kitchen and then had to call for him to come help her as she was unable to make it any  further.  Upon questioning, the patient did not have difficulty moving one or both of her legs.  The difficulty ambulating was related to her underlying dizziness and unsteady gait.  The patient's  reports that he took her blood pressure and it was not significantly elevated.  However, was unable to get her into the car to transport her to the Emergency Department, therefore called EMS.  On EMS arrival, there was noted right facial droop with difficulty speaking as well as right arm numbness and increased speech difficulty on arrival in the Emergency Department.      Upon arrival in the Emergency Department, the patient was evaluated as a code CVA.  Neurology evaluated patient and found her to be extremely aphasic with right arm numbness and slight trouble with speech.  CT of the head as well as CTA of the brain were negative for acute abnormality.  The patient was noted to be not a tPA candidate due to a recent CVA.  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  She was therefore recommended to go to Interventional Radiology for a cerebral angiogram with plans to initiate heparin drip post-procedurally if no intervention and admission to the ICU.  Hospitalist Service was contacted for admission.      Patient presently is evaluated in the Emergency Department with family at bedside who greatly assist with history.  She denies any pain at this time.  She reports that her right-sided symptoms have slightly improved since arrival in the Emergency Department, although she still has some persistent slight right arm numbness.  She and family report that she has been persistently dizzy since the prior hospitalization; however, has otherwise been appearing to improve clinically.  She has been confused intermittently which again family report that they felt that it was related to her ongoing recovery from her  initial hospitalization.  She has not had any recent fevers or chills.  No difficulty breathing, shortness of breath, chest pain, chest discomfort, nausea or vomiting, no abdominal pain, no change in bowel or bladder habits.      PAST MEDICAL HISTORY:   1.  Hypertension, longstanding and not previously on medication; was recently initiated on antihypertensive agents in the form of lisinopril 2.5 mg daily.   2.  Hyperlipidemia diagnosed during recent admission, started on Zocor.   3.  Old CVAs per CT during recent admission in the left paramedian and shellie as well as right occipital lobe without any awareness of prior symptoms or episodes in the past.      PAST SURGICAL HISTORY:  None.      FAMILY HISTORY:  Reviewed and noncontributory.      SOCIAL HISTORY:  The patient prior to recent admission was an ongoing tobacco abuser, reporting smoking a couple of cigars daily.  Since that hospitalization, she has stopped.  She also reported drinking a couple glasses of wine each evening prior to recent admission, which also has since stopped.      PRIOR TO ADMISSION MEDICATIONS:    Prior to Admission medications    Medication Sig Last Dose Taking? Auth Provider   aspirin EC 81 MG EC tablet Take 1 tablet (81 mg) by mouth daily 8/29/2017 Yes Gurinder Diaz,    simvastatin (ZOCOR) 20 MG tablet Take 1 tablet (20 mg) by mouth At Bedtime 8/29/2017 Yes Gurinder Diaz DO   lisinopril (PRINIVIL/ZESTRIL) 2.5 MG tablet Take 1 tablet (2.5 mg) by mouth daily 8/29/2017 Yes Gurinder Diaz, DO   multivitamin, therapeutic with minerals (THERA-VIT-M) TABS tablet Take 1 tablet by mouth daily 8/29/2017 Yes Unknown, Entered By History         ALLERGIES:  No known drug allergies.      REVIEW OF SYSTEMS:  A 10-point review of systems was performed and is otherwise negative.  Please refer to the HPI.      PHYSICAL EXAMINATION:   VITAL SIGNS:  Temperature 98.2, heart rate of 69, respiratory rate 13, blood pressure of 144/75,  SpO2 of 100% on 3 liters of oxygen.   GENERAL:  A well-developed, well-nourished female who appears uncomfortable.   HEENT:  Head is normocephalic.  Pupils are equal, round and reactive to light bilaterally.  EOMs are intact bilaterally without nystagmus.  Cranial nerves 2 through 12 are with slight abnormality in hypoglossal with increased movement on the right side in comparison to the left.  Otherwise, patient has no facial droop.  Her facial expressions are symmetric.  Nose and mouth are patent.  Mucous membranes are moist.   LUNGS:  Clear to auscultation bilaterally without wheeze or crackles.   HEART:  Regular rate and rhythm, normal S1 and S2, no murmur.   ABDOMEN:  Soft, nontender, nondistended, no guarding or rigidity.  Positive bowel sounds in all 4 quadrants.   NEUROLOGIC:  The patient is awake.  She is alert and appropriate.  She is able to follow commands.   strength is 5/5 bilaterally.  Plantar flexion and dorsiflexion is 5/5 bilaterally.  She is able to perform range of motion of the right arm but it does appear diminished in comparison to the left.   SKIN:  Warm and dry, no rash, no pedal edema.      LABORATORY DATA AND IMAGING:  BMP and CBC are with slight leukocytosis to 11.8, otherwise unremarkable.  INR is 0.96, PTT is 32.      ASSESSMENT AND PLAN:  Fouzia Beltrán is a 61-year-old female with past medical history of hypertension, hyperlipidemia, and tobacco abuse with recent admission for CVA 2 weeks prior who presented to the Emergency Department for evaluation of right upper extremity weakness with aphasia and slight right-sided facial droop.  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  She will be transported to Interventional Radiology and then to the Intensive Care Unit where she will be admitted inpatient status for ongoing monitoring.   1.  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  Patient presented with acute onset of right upper extremity numbness with weakness, aphasia, and right-sided facial droop.  New CVA identified on MRI.  Neurology has evaluated patient in the Emergency Department and she will be transferred to Interventional Radiology for a cerebral angiogram.  She was admitted to the ICU post-procedurally for close monitoring.  She will be n.p.o. until swallow study can be performed.  We will not perform an echocardiogram at this time nor a fasting lipid profile as these were just recently performed within 1 month ago.  Plan is to initiate heparin drip following angiogram if no intervention is performed and possible transition to oral anticoagulation per Neurology recommendations.  Neurology will be formally consulted, greatly appreciate input.   2.  Hypertension.  Continues on prior to admission lisinopril 2.5 mg daily.  We will also have p.r.n. hydralazine and labetalol available for elevated blood pressure per Neurology  parameters.   3.  Hyperlipidemia.  The patient was started on Zocor during previous admission, which she has been compliant with.  We will continue this during this admission.   4.  Tobacco abuse.  The patient was noted to have ongoing tobacco abuse during past admission; however, reports that she has since stopped.  Cessation is continued to be strongly encouraged.   5.  DVT prophylaxis with PCDs and possible addition of heparin as noted above.      CODE STATUS:  Patient is a full code.      The patient was staffed with Dr. Dottie Bosch, who independently interviewed and evaluated patient and is in agreement with above-mentioned plan.         DOTTIE BOSCH DO       As dictated by ALEJO COSTELLO PA-C            D: 2017 10:17   T: 2017 11:24   MT: ALEN      Name:     KATRIN ELY   MRN:      [id number]        Account:      [id number]   :      1955            Admitted:     [id number]      Document: [id number]       cc: Missy Orta MD

## 2017-08-30 NOTE — IP AVS SNAPSHOT
MRN:[id number]                      After Visit Summary   8/30/2017    Fouzia Beltrán    MRN: [id number]           Thank you!     Thank you for choosing [facility] for your care. Our goal is always to provide you with excellent care. Hearing back from our patients is one way we can continue to improve our services. Please take a few minutes to complete the written survey that you may receive in the mail after you visit with us. Thank you!        Patient Information     Date Of Birth          1955        Designated Caregiver       Most Recent Value    Caregiver    Will someone help with your care after discharge? yes    Name of designated caregiver family friend,  along with daughter lois    Phone number of caregiver [phone] [daughter Lois's #]    Caregiver address not on file      About your hospital stay     You were admitted on:  August 30, 2017 You last received care in the:  [facility] [facility] [address] [facility]    You were discharged on:  September 3, 2017        Reason for your hospital stay       For evaluation of stroke and enrollment in hospice care.                  Who to Call     For medical emergencies, please call 911.  For non-urgent questions about your medical care, please call your primary care provider or clinic, [phone]          Attending Provider     Provider Specialty    Emanuel Leal MD Emergency Medicine    Dottie Hinton DO Internal Medicine       Primary Care Provider Office Phone # Fax #    Missy Orta [phone]      After Care Instructions     Activity       Your activity upon discharge: Bedrest            Diet       Follow this diet upon discharge: Eat and drink for comfort or pleasure                  Follow-up Appointments     Follow-up and recommended labs and tests        Follow up with [facility] tomorrow.                  Additional Services     HOSPICE REFERRAL       **Order classes of: [facility], [facility] and [facility]  Homecare will route to the Home Care and Hospice Referral Pool.  Home Care or Hospice will then contact the patient to schedule their appointment.**    If you do not hear from Home Care and Hospice, or you would like to call to schedule, please call the referring place of service that your provider has listed below.  ______________________________________________________________________    Your provider has referred you to: [facility]: [facility] [phone]   [email or web address]    Extended Emergency Contact Information  Primary Emergency Contact: Juancho Davila   [facility]  Home Phone: [phone]  Mobile Phone: [phone]  Relation: Spouse  Secondary Emergency Contact: Lois   [place]  Mobile Phone: [phone]  Relation: Daughter    Patient Anticipated Discharge Date: 9/3/2017   RN, PT, HHA to begin 24 - 48 hours after discharge.  PLEASE EVALUATE AND TREAT (Evaluation timeline is 24 - 48 hrs. Please call if there is need for a variance to this timeline).    REASON FOR REFERRAL: Hospice - Diagnosis: Stroke    ADDITIONAL SERVICES NEEDED:     OTHER PERTINENT INFORMATION: Patient was last seen by provider on 9/3/2017 for stroke.    No current outpatient prescriptions on file.    Patient Active Problem List:     Left-sided weakness     CVA (cerebral vascular accident) (H)     HTN (hypertension)     Mixed hyperlipidemia      Documentation of Face to Face and Certification for Home Health Services    I certify that patient, Fouzia Beltrán is under my care and that I, or a Nurse Practitioner or Physician's Assistant working with me, had a face-to-face encounter that meets the physician face-to-face encounter requirements with this patient on: 9/3/2017.    This encounter with the patient was in whole, or in part, for the following medical condition, which is the primary reason for Home Health Care: Stroke.    I certify that, based on my findings,  the following services are medically necessary Home Health Services: home hospice care for stroke    My clinical findings support the need for the above services because: Nurse is needed: home hospice  Further, I certify that my clinical findings support that this patient is homebound (i.e. absences from home require considerable and taxing effort and are for medical reasons or [employer] services or infrequently or of short duration when for other reasons) because: Patient is bedbound due to: stroke.    Based on the above findings, I certify that this patient is confined to the home and needs intermittent skilled nursing care, physical therapy and/or speech therapy.  The patient is under my care, and I have initiated the establishment of the plan of care.  This patient will be followed by a physician who will periodically review the plan of care.    Physician/Provider to provide follow up care: Missy Orta    [facility] certified Physician at time of discharge: Moise    Please be aware that coverage of these services is subject to the terms and limitations of your health insurance plan.  Call member services at your health plan with any benefit or coverage questions.            Home care nursing referral       Home hospice services    Your provider has ordered home care nursing services. If you have not been contacted within 2 days of your discharge please call the inpatient department phone number at [phone] .                  Pending Results     No orders found from 8/28/2017 to 8/31/2017.            Statement of Approval     Ordered          09/03/17 1040  I have reviewed and agree with all the recommendations and orders detailed in this document.  EFFECTIVE NOW     Approved and electronically signed by:  Roddy Phipps MD             Admission Information     Date & Time Provider Department Dept. Phone    8/30/2017 Dottie Hinton DO [place] [address] [facility] [phone]     "  Your Vitals Were     Blood Pressure Pulse Temperature Respirations Height Weight    125/62 92 101.5  F (38.6  C) 20 1.626 m (5' 4\") 85.5 kg (188 lb 7.9 oz)    Pulse Oximetry BMI (Body Mass Index)                100% 32.35 kg/m2          [employer] Information     [facility] lets you send messages to your doctor, view your test results, renew your prescriptions, schedule appointments and more. To sign up, go to [email or web address] . Click on \"Log in\" on the left side of the screen, which will take you to the Welcome page. Then click on \"Sign up Now\" on the right side of the page.     You will be asked to enter the access code listed below, as well as some personal information. Please follow the directions to create your username and password.     Your access code is: [email or web address]  Expires: 2017  3:43 PM     Your access code will  in 90 days. If you need help or a new code, please call your [facility] clinic or [phone].        Care EveryWhere ID     This is your Care EveryWhere ID. This could be used by other organizations to access your [facility] medical records  [id number]        Equal Access to Services     RODOLFO MACK AH: Teresa wildero Dionicio, waaxda luqadaha, qaybta kaalmada adeegyada, zari narayan. So [facility] [phone].    ATENCIÓN: Si habla español, tiene a dillard disposición servicios gratuitos de asistencia lingüística. Llame al [phone].    We comply with applicable federal civil rights laws and [place] laws. We do not discriminate on the basis of race, color, national origin, age, disability sex, sexual orientation or gender identity.               Review of your medicines      START taking        Dose / Directions    acetaminophen 32 mg/mL solution   Commonly known as:  TYLENOL   Used for:  Cerebrovascular accident (CVA), unspecified mechanism (H)        Dose:  650 mg   Take 20.3 mLs (650 mg) by mouth every 4 hours as needed for fever or mild pain "   Quantity:  237 mL   Refills:  1       atropine 1 % ophthalmic solution   Used for:  Cerebrovascular accident (CVA), unspecified mechanism (H)        Dose:  2-4 drop   Take 2-4 drops by mouth, place under tongue or place inside cheek every 2 hours as needed for other (terminal respiratory secretions) Not for ophthalmic use.   Quantity:  5 mL   Refills:  1       bisacodyl 10 MG Suppository   Commonly known as:  DULCOLAX   Used for:  Cerebrovascular accident (CVA), unspecified mechanism (H)        Unwrap and insert 1 suppository rectally twice daily as needed for constipation.   Quantity:  12 suppository   Refills:  1       haloperidol 2 MG/ML (HIGH CONC) solution   Commonly known as:  HALDOL   Used for:  Cerebrovascular accident (CVA), unspecified mechanism (H)        Dose:  1-2 mg   Take 0.5-1 mLs (1-2 mg) by mouth, place under tongue or insert rectally every 6 hours as needed for agitation (nausea)   Quantity:  30 mL   Refills:  1       LORazepam 2 MG/ML (HIGH CONC) solution   Commonly known as:  ATIVAN   Used for:  Cerebrovascular accident (CVA), unspecified mechanism (H)        Dose:  0.5-1 mg   Take 0.25-0.5 mLs (0.5-1 mg) by mouth, place under tongue or insert rectally every 4 hours as needed for anxiety (restlessness)   Quantity:  30 mL   Refills:  1       MEDICATION INSTRUCTION   Used for:  Cerebrovascular accident (CVA), unspecified mechanism (H)        If care facility cannot accept or use ranges, facility is instructed to use lower end of dosing range   Refills:  0       morphine 2.5 MG solu-tab   Used for:  Cerebrovascular accident (CVA), unspecified mechanism (H)        Dose:  2.5 mg   Place 1 tablet (2.5 mg) under the tongue every 2 hours as needed for moderate to severe pain (and/or shortness of breath.)   Quantity:  30 tablet   Refills:  0         STOP taking     aspirin 81 MG EC tablet           lisinopril 2.5 MG tablet   Commonly known as:  PRINIVIL/Zestril           multivitamin, therapeutic with  minerals Tabs tablet           simvastatin 20 MG tablet   Commonly known as:  ZOCOR                Where to get your medicines      Some of these will need a paper prescription and others can be bought over the counter. Ask your nurse if you have questions.     Bring a paper prescription for each of these medications     acetaminophen 32 mg/mL solution    atropine 1 % ophthalmic solution    bisacodyl 10 MG Suppository    haloperidol 2 MG/ML (HIGH CONC) solution    LORazepam 2 MG/ML (HIGH CONC) solution    morphine 2.5 MG solu-tab       You don't need a prescription for these medications     MEDICATION INSTRUCTION                Protect others around you: Learn how to safely use, store and throw away your medicines at www.disposemymeds.org.             Medication List: This is a list of all your medications and when to take them. Check marks below indicate your daily home schedule. Keep this list as a reference.      Medications           Morning Afternoon Evening Bedtime As Needed    acetaminophen 32 mg/mL solution   Commonly known as:  TYLENOL   Take 20.3 mLs (650 mg) by mouth every 4 hours as needed for fever or mild pain   Last time this was given:  9/1/17 2230   Next Dose Due:  Available every 4 hours if needed for pain                            Available every 4 hours if needed for pain       atropine 1 % ophthalmic solution   Take 2-4 drops by mouth, place under tongue or place inside cheek every 2 hours as needed for other (terminal respiratory secretions) Not for ophthalmic use.   Last time this was given:  Not given in hospital   Next Dose Due:  Available every 2 hours if needed for pain                            Available every 2 hours if needed for pain       bisacodyl 10 MG Suppository   Commonly known as:  DULCOLAX   Unwrap and insert 1 suppository rectally twice daily as needed for constipation.   Last time this was given:  Not given in hospital   Next Dose Due:  Available twice daily if needed for  "constipation                            Available twice daily if needed for constipation       haloperidol 2 MG/ML (HIGH CONC) solution   Commonly known as:  HALDOL   Take 0.5-1 mLs (1-2 mg) by mouth, place under tongue or insert rectally every 6 hours as needed for agitation (nausea)   Last time this was given:  Not given in hospital   Next Dose Due:  Available every 6 hours if needed for agitation or nausea                            Available every 6 hours if needed for agitation or nausea       LORazepam 2 MG/ML (HIGH CONC) solution   Commonly known as:  ATIVAN   Take 0.25-0.5 mLs (0.5-1 mg) by mouth, place under tongue or insert rectally every 4 hours as needed for anxiety (restlessness)   Last time this was given:  Not given in hospital   Next Dose Due:  Available every 4 hours if needed for anxiety or restlessness                            Available every 4 hours if needed for anxiety or restlessness       MEDICATION INSTRUCTION   If care facility cannot accept or use ranges, facility is instructed to use lower end of dosing range                                morphine 2.5 MG solu-tab   Place 1 tablet (2.5 mg) under the tongue every 2 hours as needed for moderate to severe pain (and/or shortness of breath.)   Last time this was given:  9/3/17 2:30PM   Next Dose Due:  Available starting at 9/3/17 4:30PM                            Available starting at 9/3/17 4:30PM                 More Information      Comfort Measures  End-of-Life Care for Patients and Their Families  What does \"comfort measures only\" mean?  \"Comfort measures only\" is a medical treatment at the end of life. It means we let the natural dying process happen while keeping you as comfortable as possible.  After talking with you and your family about care goals, your doctor may write an order for Comfort measures only. This helps make sure that any further treatments follow your wishes.  \"Comfort measures only\" may be right for you if:    It is " not possible to prolong life or hope for a cure.    Comfort is your main concern.    You are not okay with your expected quality of life.    Your health care agent tells us that comfort is the main goal, based on your previously expressed wishes.  What to expect  Comfort measures may include:    Offering pain and symptom management. This could be medicines or other therapies.    Offering a quiet, private area that supports the end-of-life process    Giving spiritual care    Limiting how often we check vital signs    Stopping medicines that do not aid comfort    Stopping needle sticks and blood draws  Changes that may happen: (please note, every patient is different)    Sleeping more    Eating or drinking less    Feeling confused    Decreased vision and hearing    Hallucinations, talking to those who are not there    Incontinence (loss of control of bladder or bowels)    Cool hands, fingers or toes  Family involvement and support  To help ease your loved one's symptoms and offer comfort, try the tips below.    Let your loved one sleep.    Include children from your family in a way that's appropriate for their age.    Keep your loved one's lips moist. You can use ice chips, ointments or mouth sponges.    Ask for medicines to treat symptoms.    Write down memories of the journey and stories your loved one has told.    Play your loved one's favorite music.    Keep a calm environment.    Continue to touch and stay close to your loved one.    Tell the health care team about any and all concerns.  Tell your health care team right away if your loved one:    Feels restless or anxious    Has changes in breathing    Feels pain or discomfort    Needs spiritual support or Spiritism and cultural traditions  Where comfort care is given  Discharge from the hospital for end-of-life care in hospice or home care will usually happen within 24 to 48 hours of choosing comfort measures. We may move your loved one from a critical care  setting to a medical or surgical unit while we make a discharge plan.  Critical care units offer one-on-one nursing care during the patient's stay. Our medical units also give patient-focused care, but nurses care for more patients at one time.  No matter what, our focus will be to give the best care to you and your loved one.  Our nursing staff will visit patients every 1 to 2 hours to make sure we are meeting the needs of our patients and families. Please tell us how we can support you at this time.  More resources:    Palliative Care Services    Spiritual Health Services ()    Bereavement support, offered by [facility]: [phone]  [email or web address]  For informational purposes only. Not to replace the advice of your health care provider.   Copyright   2014 [facility]. All rights reserved. [employer] 323910 - REV 09/16.            Hospice: As Death Nears  You have a loved one who is receiving care at the end of life. You have been helping to make your loved one comfortable. As he or she moves into the final stages, this sheet can help you find ways to help your loved one die with dignity.    How you can help  In the weeks and days before death, a person goes through changes in both body and mind. Your loved one may sleep more and breathe differently. He or she may weaken and lose interest in food or fluids. His or her skin may become drier. His or her mind may wander in and out of awareness. These are natural processes. Stay close to your loved one during this time. You can help make this transition time more comfortable for your loved one. Follow the tips below. And keep in touch with the hospice team:    Put the person s bed in a central place. This way, he or she can still be part of the family during the transition.    Make sure pain is controlled. Your loved one should not be in pain. Be sure that pain medicine is given on schedule. Let the hospice team know if your loved  one is still in pain. If your loved one can t speak, look for signs. A worried or pained look may mean that he or she is in pain.    Give medicines as advised. Your loved one may be prescribed medicines to ease breathing, nausea, anxiety, or other symptoms. Be sure that your loved one gets these medicines on time. And let the hospice team know if there are symptoms that are not controlled.    Help keep your loved one clean. As death nears, loss of bladder or bowel control is common. Use absorbent pads on the bed. Change these as often as needed.    Gently adjust your loved one s position. This can help reduce the risk of pressure sores. It can also help lessen breathing discomfort. Try a slight sitting-up position. Or try a slight side position. Use pillows to help position your loved one. Also, put pillows underneath bony joints to reduce discomfort and prevent sores. Change your loved one's position often, about every 2 hours.    Help keep the mouth clean and moist. Use mouth swabs to clean the edges and inside of your loved one's mouth. Ask the hospice team for these special swabs. Or gently wipe a damp washcloth around your loved one s mouth to ease dryness. Apply balm to his or her lips to keep them moist. If your loved one can swallow, offer ice chips or tiny sips of water.    Offer pleasure feeding. If your loved one wants certain foods, you may be able to give tiny amounts of those foods. Don t try to feed more than your loved one can handle.    Keep skin moist. Apply lotion to your loved one s skin and gently massage.    Adjust the bed covers for temperature. The body s thermostat does not work well at the end of life. Your loved one may become too hot or too cold. If your loved one is cold, cover him or her with a soft blanket. If your loved one is warm, switch to a light sheet.    Let your loved one rest. Let sleep happen. If your loved one is asleep, keep noise levels low. Don t try to wake him or her.  Let your loved one sleep and wake on his or her own.    Talk to your loved one. Your loved one can likely hear you, even if he or she looks to be asleep. He or she may be able to hear you up until the moment of death. Speak calmly and softly. Know that your loved one may not respond. If your loved one is agitated or upset, speak reassuringly.    Let your loved one talk. Even if it doesn t make sense, let him or her speak. You may want to write down what your loved one says. Things that he or she says now may not make sense to you. But later these words may give you comfort.    Touch your loved one. Hold his or her hand. Gently stroke his or her arm or head. This lets your loved one know you are there giving your comfort and love.    Help your loved one have closure. Now is the time that a person may ask for or offer forgiveness. He or she may want to be  allowed to leave.  This is also a time for offering appreciation and thanks. Express your feelings. When you are ready, give your loved one permission to go.  As the body nears death  Death happens differently for each person. Some are awake and talking until the very end. Others may be unconscious or shift between levels of awareness. As death nears, the body goes through some common signs. These are normal and expected. They include:    Changes in skin color (blue, gray, yellow, or blotchy)    Rattling or gurgling sounds when breathing    Very slow or irregular breathing    Feeling cool or hot to the touch    Change in bowel or bladder function    A sudden burst of energy    Restlessness    Anger    Confusion    Loss of interest in people and the world around him or her    Talking about leaving or taking a trip or journey    Seeing and talking to people not in the room  As you let go  Being with a dying person can be deeply moving. At the same time, you re likely to feel conflicting emotions. Feelings of grief, shock, fear, guilt, anger, and even relief can  surface. They may take you by surprise. During this time, don t be afraid of your feelings. And be open with your tears and grief. These are normal parts of witnessing death. Your sorrow won t make things harder for the person who s dying. If you need support, the hospice team is there to help.  Date Last Reviewed: 3/1/2017    6420-2720 [employer]. [address] [place] [address], [place] [address]. All rights reserved. This information is not intended as a substitute for professional medical care. Always follow your healthcare professional's instructions.

## 2017-08-30 NOTE — PHARMACY-ADMISSION MEDICATION HISTORY
Admission medication history interview status for the 8/30/2017  admission is complete. See EPIC admission navigator for prior to admission medications     Medication history source reliability:Good    Actions taken by pharmacist (provider contacted, etc): interviewed pt     Additional medication history information not noted on PTA med list :None    Medication reconciliation/reorder completed by provider prior to medication history? No    Time spent in this activity: 5 minutes    Prior to Admission medications    Medication Sig Last Dose Taking? Auth Provider   aspirin EC 81 MG EC tablet Take 1 tablet (81 mg) by mouth daily 8/29/2017 Yes Gurinder Diaz, DO   simvastatin (ZOCOR) 20 MG tablet Take 1 tablet (20 mg) by mouth At Bedtime 8/29/2017 Yes Gurinder Diaz, DO   lisinopril (PRINIVIL/ZESTRIL) 2.5 MG tablet Take 1 tablet (2.5 mg) by mouth daily 8/29/2017 Yes Gurinder Diaz, DO   multivitamin, therapeutic with minerals (THERA-VIT-M) TABS tablet Take 1 tablet by mouth daily 8/29/2017 Yes Unknown, Entered By History

## 2017-08-30 NOTE — PROGRESS NOTES
hospitalist update note: events following admission noted, patient decompensated with acute neurologic changes following angiogram. She was emergently intubated with repeat MRI indicating multiple brain stem strokes. She will remain intubated/sedated in ICU for ongoing management. hospitalist will sign-off at this time. Discussed with ICU physician, please call when patient is able to transfer from ICU to assume care.    Bart Marc PA-C  8/30/2017, 4:22 PM  Pager: [phone].

## 2017-08-30 NOTE — PROGRESS NOTES
[facility]    Critical Care Service  Progress Note    Date of Service (when I saw the patient): 08/30/2017     Assessment & Plan   Fouzia Beltrán is a 61 year old female who was admitted on 8/30/2017.     Main Plans for Today   Remain intubated tonight  Propofol for sedation  MRI 8/31    Neuro  1. Basilar artery occlusion, multiple new brain stem strokes  2. Failed recannulization in IR, complicated procedure by pt vomiting and sudden decreased mentation  3. Acute pain  4. Sedation  Plan:  1. Neuro critical care consulted - MRI 8/31 AM  2. Heparin gtt initiated  3. Propofol for sedation as needed until extubation  4. Hydromorphone 0.3-0.5 IV PRN   5. Delirium prevention as able    CV  1. Hx of HTN - on lisinopril at home  2. Keep BP<180   Plan:  -- Labetalol and hydralazine IV PRN    Resp:  1. Post procedure ventilator management  2. Acute respiratory failure - likely secondary to basilar artery occlusion and probably aspiration during IR procedure  Plan:  -- ABG to assess vent settings  -- Current settings are: CMV 45%  rate 16 PEEP 5  -- Remain intubated overnight through MRI tomorrow AM, then PST when hemodynamically stable    GI/Nutrition  1. No prior hx  Plan:  -- NPO  -- GI prophylaxis - famotidine   --OG inserted for PO meds if needed    Renal  1. No prior hx.  Baseline creatnine appears to be 0.76  Plan:  -- Monitor I/O closely with awan catheter    ID  1. Probably aspiration during IR procedure  Plan:   --Rocephin 1g IV daily    Endocrine  1. Monitor for stress Hyperglycemia  Plan:  --  Insulin gtt per protocol if indicated  -- Keep BG  <150 for optimal healing    Heme:  1. No current issues  Plan:  -- Monitor hemoglobin.  -- Transfuse to keep > 7.0    MSK  1. No issues  Plan:    Skin  1. No issues   Plan:    General cares:  DVT Prophylaxis: Pneumatic Compression Devices and heparin drip  GI Prophylaxis: H2 Blocker  Restraints: Restraints for medical healing needed: YES  Family  "update by me today: Yes   Current lines are required for patient management  Access:  PIV    Time Spent on this Encounter   Billing:  I spent 60 minutes bedside and on the inpatient unit today managing the critical care of Fouzia Beltrán in relation to the issues listed in this note.    Interval History   Pt presents to ED today with severe aphasia, dizziness, and RUE paraesthesias. CTA brain was inconclusive for basilar artery occlusion. The patient was not a candidate for tPa (recent stroke during hospital admission on 8/15-8/16), thus MRI was obtained. MRI demonstrated \"1. Recent areas of infarction in the shellie and lower midbrain. These have increased in extent in the interval. 2. Recent areas of infarction in the thalami left more than right. These are new since the prior study.\" per radiology read. Following this study, the patient was emergently brought to IR to attempt retrieval of basilar artery clot. Unfortunately, retrieval of the clot was unsuccessful and the patient decompensated during the procedure, prompting emergent intubation and transfer to the ICU.     Physical Exam   Temp: 95.7  F (35.4  C) Temp src: Bladder Temp  Min: 95.7  F (35.4  C)  Max: 98.2  F (36.8  C) BP: (!) 176/98 Pulse: 92 Heart Rate: 76 Resp: 18 SpO2: 100 % O2 Device: Nasal cannula Oxygen Delivery: 3 LPM  Vitals:    08/30/17 0807   Weight: 79.4 kg (175 lb)          GEN: The patient is lying in bed, eyes closed, unresponsive to loud voice stimulation. Sedated on propofol.   EYES: Pupils irregular, left pupil fixed and dilated at 4mm, right sluggishly reactive at 2mm - neuro notified. Conjugate gaze. Anicteric sclera.   HEENT:  Normocephalic, atraumatic, trachea midline, ETT secured   CV: RRR, no gallops, rubs, or murmurs  PULM/CHEST: Coarse crackles bilaterally, symmetric chest rise  GI: normal bowel sounds, soft, non-tender, no rebound tenderness or guarding, no masses  : awan catheter in place, urine yellow and " clear  EXTREMITIES: No peripheral edema, peripheral pulses intact. No active extremity movement.  NEURO: Positive gag reflex. Positive babinski on right. Decorticate posturing frequently - neuro notified. Neuro exam limited d/t sedation  SKIN: No rashes, sores or ulcerations  PSYCH:  Sedated.    Imaging personally reviewed:  ECG, CT, MRI brain, chest XR    Medications     - MEDICATION INSTRUCTIONS -       NaCl       propofol (DIPRIVAN) infusion 5 mcg/kg/min (08/30/17 1344)     HEParin 800 Units/hr (08/30/17 1517)     - MEDICATION INSTRUCTIONS -       - MEDICATION INSTRUCTIONS -         co-enzyme Q-10  100 mg Oral Daily     cholecalciferol  2,000 Units Oral Daily     lisinopril  2.5 mg Oral Daily     simvastatin  20 mg Oral At Bedtime     aspirin EC  81 mg Oral Daily     chlorhexidine  15 mL Mouth/Throat Q12H       Data     Recent Labs  Lab 08/30/17  0812   WBC 11.8*   HGB 15.3   *      INR 0.96      POTASSIUM 4.1   CHLORIDE 107   CO2 27   BUN 12   CR 0.80   ANIONGAP 5   ELSA 9.4   *     Recent Results (from the past 24 hour(s))   CT Head w/o Contrast    Narrative    CT HEAD W/O CONTRAST  8/30/2017 8:22 AM    HISTORY: Stroke symptoms with right-sided weakness and double vision.    TECHNIQUE: Scans were obtained through the head without IV contrast.   Radiation dose for this scan was reduced using automated exposure  control, adjustment of the mA and/or kV according to patient size, or  iterative reconstruction technique.    COMPARISON: MRI and CT scan 8/15/2017.    FINDINGS: 2 cm old right occipital lobe infarct and central/left  paramedian old pontine infarct as noted on MR of 8/15/2017 and CT of  8/15/2017.  No hemorrhage, mass lesion, or focal area of acute  infarction identified. Paranasal sinuses are normal. No bony  abnormality. Mild atrophy. CT angiogram follow-up.      Impression    IMPRESSION:   1. Atrophy and old infarcts as described.  2. Nothing acute.  3. CT angiogram to follow.  4 no interval change.    SOBEIDA MORTENSEN MD   CTA Angiogram Head Neck    Narrative    CT ANGIOGRAM OF THE HEAD AND NECK WITHOUT AND WITH CONTRAST  8/30/2017  8:30 AM     HISTORY: Right-sided weakness and double vision.    TECHNIQUE: Precontrast localizing scans were followed by CT  angiography with an injection of 70 mL nonionic contrast material IV  with scans through the head and neck.  Images were transferred to a  separate 3-D workstation where multiplanar reformations and 3-D images  were created. Estimates of carotid stenoses are made relative to the  distal internal carotid artery diameters except as noted. Radiation  dose for this scan was reduced using automated exposure control,  adjustment of the mA and/or kV according to patient size, or iterative  reconstruction technique.    COMPARISON: CT angiogram 8/15/2017.    FINDINGS:  Suboptimal bolus of contrast makes the [place]  vasculature difficult to interpret. The distal basilar artery appears  quite small and origin of the posterior cerebral arteries off the  basilar tip are not well seen. This could be site of thrombus or  high-grade stenosis. This basilar tip region is less clearly seen  today than on 8/15/2017. Part of this is due to the fact that bolus  injection was not as effective today but there may be new clot or  stenosis in the basilar tip region. These findings were discussed with  Dr. Lo. Conventional angiogram might be of value. No evidence for  aneurysm.    No evidence for stenosis of either carotid bifurcation. Antegrade flow  in both vertebral arteries.    The origin of the great vessels off the aortic arch are intact.      Impression    IMPRESSION:  1. Distal basilar and basilar tip not well seen. Clot or severe  stenosis could be present. This is more evident today than on the  prior exam.  2. No stenosis at either carotid bifurcation.  3. Findings discussed with Dr. Lo. Conventional angiogram might be  of value for  further evaluation of the [place].    SOBEIDA MORTENSEN MD   CT Head w Contrast    Narrative    CT BRAIN PERFUSION 8/30/2017 8:56 AM    HISTORY: Code Stroke.    TECHNIQUE: Time sequential axial CT images of the head were acquired  during the administration of intravenous contrast 50 mL [id number]-370.  CTA images of the [place] as well as color perfusion maps of  the brain were created from this time sequential axial source data.  Radiation dose for this scan was reduced using automated exposure  control, adjustment of the mA and/or kV according to patient size, or  iterative reconstruction technique.    COMPARISON: CT angiogram today.    FINDINGS: Perfusion abnormalities are present in the central shellie and  both thalami consistent with infarction.      Impression    IMPRESSION: Brainstem and bithalamic perfusion defects.    SOBEIDA MORTENSEN MD   MR Brain w/o Contrast    Narrative    MR BRAIN WITHOUT CONTRAST  8/30/2017 9:06 AM    HISTORY: Stroke.    COMPARISON: CT and perfusion scans today.    TECHNIQUE: Routine noncontrast MR brain.    FINDINGS: Moderate-sized areas of restricted diffusion scattered  throughout the central shellie and lower midbrain consistent with recent  infarcts. Additionally, there is restricted diffusion in a 1 cm region  of the left thalamus and a tiny portion of the right thalamus also  consistent with recent infarcts. Thalamic infarcts were not present on  8/15/2017. There were smaller areas of brain stem infarction on the  prior study but considerably more restricted diffusion today.    There is an old infarct in the right occipital lobe 2.5 cm in size.    No evidence for hemorrhage.      Impression    IMPRESSION:  1. Recent areas of infarction in the shellie and lower midbrain. These  have increased in extent in the interval.  2. Recent areas of infarction in the thalami left more than right.  These are new since the prior study.  3. Findings discussed with Dr. Lo.    SOBEIDA  MD FESTUS   IR Carotid Cerebral Angiogram Bilateral    Narrative    [facility]   INTERVENTIONAL NEURORADIOLOGY   PROCEDURAL NOTE    1. ENDOVASCULAR MECHANICAL THROMBECTOMY OF A BASILAR ARTERY  THROMBOEMBOLIC OCCLUSION USING THE PENUMBRA REPERFUSION DEVICE AND THE  SOLITAIRE STENT RETRIEVER DEVICE  2. CEREBRAL ANGIOGRAM: SELECTIVE CATHETERIZATION OF THE LEFT VERTEBRAL  ARTERY WITH ANGIOGRAPHIC IMAGING CENTERED OVER THE HEAD   3. CEREBRAL ANGIOGRAM: SELECTIVE CATHETERIZATION OF THE RIGHT COMMON  CAROTID ARTERY WITH ANGIOGRAPHIC IMAGING CENTERED OVER THE HEAD   4. CEREBRAL ANGIOGRAM: SELECTIVE CATHETERIZATION OF THE LEFT COMMON  CAROTID ARTERY WITH ANGIOGRAPHIC IMAGING CENTERED OVER THE HEAD     INDICATION: History of dizziness, weakness, slurred speech, and  possible basilar tip occlusion identified on CTA. Angiography to be  performed for further assessment and possible endovascular mechanical  thrombectomy.     CONSENT: Cerebral angiography and endovascular mechanical thrombectomy  were discussed in detail. The procedures' indications, major risks,  benefits, and alternatives were discussed. Risks including, but not  limited to, arterial perforation or dissection, stroke (including  reperfusion hemorrhage), and cardiac or pulmonary complications of  sedation, were discussed. Understanding was acknowledged, and informed  consent was obtained.      MODERATE SEDATION: Versed 7.5 mg. Fentanyl 350 mcg. The procedure was  performed with administration intravenous conscious sedation with  appropriate preoperative, intraoperative, and postoperative  evaluation. 235 minutes of supervised face to face conscious sedation  time was provided by a radiology nurse under my direct supervision.     MEDICATIONS:   Versed 7.5 mg IV   Fentanyl 300 mcg IV     CONTRAST: 185 cc Omnipaque 300     FLUOROSCOPIC TIME: 94.2 minutes     DOSE: Air Kerma: 6205 mGy     ESTIMATED BLOOD LOSS: 100 cc     PERFORMING PHYSICIAN(S):   Anson  SEUN Macias M.D.   Juancho Aponte M.D.    PROCEDURE: The patient was placed supine upon the neuroangiography  table. The skin of both groins was prepped and draped in sterile  fashion. Under local anesthesia with lidocaine and sterile technique,  the right common femoral artery was percutaneously accessed with a 4  Ivorian micropuncture set. A short 6 Ivorian sheath was then placed. A 5  Ivorian diagnostic catheter was then advanced over an angled Glidewire  into the aortic arch and used to selectively and subselectively  catheterize the left vertebral artery. Digital subtraction angiograms  of the head were performed in multiple projections with this  catheterization.     PRE-TREATMENT FINDINGS:     Images of the left vertebral artery centered over the head demonstrate  TICI0-1 basilar tip occlusion with faint filling of the left PCA  distal to a focal stenosis. No filling of the right PCA or left SCA.  The distal basilar artery is narrowed and irregular.    PROCEDURE CONTINUED:     The diagnostic catheter was then exchanged for a Neuron MAX guiding  sheath over an exchange length Puente wire. The left vertebral artery  was tortuous, limiting the degree to which the Neuron MAX could be  advanced. The Penumbra ACE68 reperfusion catheter was then advanced  coaxially with a 3MAX ACE reperfusion catheter over a Fathom 16 wire  around the proximal tortuosity and up the left vertebral artery to the  basilar occlusion. The Neuron MAX was advanced further to the lower  cervical segment. The clot was noted to be very firm with the wire  banking off of rather than passing through on the first several  attempts. The ACE68 was positioned at the proximal clot face and  aspiration was performed. The system was removed and angiography  performed. The basilar occlusion progressed more proximally, now with  a lack of filling of the right superior cerebellar artery. This may be  secondary to intimal injury and/or propagated thrombus  from  instrumentation.     A second pass was then performed using the same system with the same  result.     Next, given failure of the reperfusion catheter system to open the  occlusion, we proceeded with a Solitaire stent retriever (4 x 20 mm),  spanning the left PCA to the basilar trunk. This recanalized the  basilar artery into the left posterior cerebral artery. However, the  underlying intima appeared very irregular. The vessel spontaneously  closed again after performing a short interval follow-up angiogram.    Angiography of the carotid arteries was performed bilaterally to  assess collateral flow via a 125 cm Berenstein diagnostic catheter  through the Neuron MAX. This identified only collateral supply to the  left PCA (which had been previously cleared with the stent retriever),  but no contribution to the right PCA or basilar tip, consistent with  unchanged occlusion.    Dr. Aponte at this time arrived as an additional  for the  procedure.    Given the severe irregularity of the vessel, we next attempted to open  the right posterior cerebral or superior cerebellar arteries with a  3MAX ACE reperfusion catheter (smallest profile). However, this was  unsuccessful.     Dr. Lo was also present and we jointly discussed the risks and  benefits of potentially placing a Wingspan stent. We decided to  attempt access for Wingspan stent placement. However, once again, the  origin of the vertebral artery contributed to significant instability  of the system and the entire system prolapsed into the aortic arch  when advancing the stenting system over the exchange length wire. Wire  positioning in the right PCA/SCA was initially confirmed following  several microangiograms. The patient additionally became nauseated  during the procedure and began vomiting, for which suction was  performed and anesthesiology called for intubation.    Given failed recanalization despite numerous attempts and  significant  access challenges, we decided to conclude the procedure. The system  was removed and hemostasis achieved with manual pressure.    POST-TREATMENT FINDINGS:     Images of the left vertebral artery centered over the head demonstrate  persistent occlusion of the basilar trunk.    Images of the left common carotid artery centered over the head  demonstrate good collateral PCOM supply to the left PCA. Normal  variant infundibular origin of the PCOM.    Images of the right common carotid artery centered over the head  demonstrate no significant collateral supply to the right PCA or  basilar tip. Normal variant infundibular origin of the PCOM.    INTERVENTION DATA POINTS:   Femoral artery access: 1002 hours   Clot ID: [id number]   Aspiration start/end: 1028-31, 1044-49, 1127-28, 8418-6630, 1220-24  hours   Pretreatment TICI score: 0-1   Final TICI score: 0    CONCLUSION:     TICI0-1 basilar tip occlusion with faint filling of the left PCA. No  filling of the right PCA or left SCA.    Despite multiple attempts with various reperfusion catheters and a  Solitaire stent retriever, the basilar artery remained occluded.    Results were discussed with the patient's family and neurology team  immediately following the procedure.     Evaluation and stenosis determination based on NASCET criteria.   _____________________________________________   Anson Mercedes M.D.   Interventional Neuroradiologist   [facility]   Office: [phone]   Pager: [phone]   Cell: [phone]       ANSON MERCEDES MD   MR Brain w/o Contrast    Narrative    MRI BRAIN WITHOUT CONTRAST August 30, 2017 2:58 PM    HISTORY: Massive stroke.    TECHNIQUE: Multiplanar, multisequence MRI of the brain without  gadolinium IV contrast material.      COMPARISON: 8/30/2017 at 0848 hours.    FINDINGS: Diffusion-weighted images show significantly increased of  areas of restricted diffusion in the posterior vascular  distribution  involving both thalamic nuclei, the midbrain, shellie, and right  cerebellum as well as both occipital lobes. There is no definite  evidence for any hemorrhage. There is an old small infarct in the  right occipital lobe which is now surrounded by areas of increased  signal and restricted diffusion suggesting that there is no infarct in  this location. There is no midline shift.      Impression    IMPRESSION: Multifocal acute ischemic infarcts involving the  brainstem, thalamic nuclei, right cerebellum, and both occipital lobes  with progression since scan earlier on same day. There is no evidence  for any significant mass effect or hydrocephalus.    MD Marily BUTTS, APRN CNP  Pager: [phone] (7a - 6p)

## 2017-08-30 NOTE — CONSULTS
[facility] [place]  [facility]    Vascular Neurology / Stroke Consultation Note     Fouzia Beltrán MRN# [id number]   YOB: 1955 Age: 61 year old    Code Status:Prior   Date of Admission: 8/30/2017  Date of Consult: 08/30/2017    ADDENDUM: 8/30/2017  2:48 PM  MRI demonstrated multiple new brain stem strokes. They are likely more extensive than currently seen due to ultra-early MRI   Will keep patient on heparin infusion and keep intubated. Get another MRI brain tomorrow morning  I had multiple discussions with the family. I explained them strokes are very severe and severe disability is likely    Total neurocritical care time from 0815 to 14:51. I was at the bedside all the time, including ER, MRI, IR, MRI. Patient is critically ill  ----------------------------------------------------  Cm Lo MD, PhD, [facility]          ADDENDUM: 8/30/2017  2:06 PM  Patient's right pupils is 5 mm and non-reactive, suggesting midbrain stroke. Patient became unresponsive and was emergently intubated. At that time interventions were aborted due to futility.   Patient is taken to MRI for evaluation.   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 Attempt to remove the clot resulted in further occlusion of the basilar artery. Multiple attempts to recanalize the vessel failed.   ----------------------------------------------------  Cm Lo MD, PhD, [facility]     _________________________________   Primary Care Physician   Missy Orta  ______________________________________________         Assessment & Plan   ______________________________________________  (I63.219,  I63.22) Acute ischemic VBA brainstem stroke, unspecified laterality (H)  --symptoms started upon waking this morning  --not tPA candidate due to stroke 2 weeks  ago - right pontine stroke on 8/15/17  --CT head negative for acute abnormality  ----MRI brain with multiple new bilateral pontine, midbrain and thalamic infarcts and 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 DVT Prophylaxis  --Mechanical for now  #. PT/OT/Speech  --Start evaluations after admission  #. Nutrition / GI Prophylaxis  --Per recommendations of speech therapy      #. Code Status: Full Code     TIME:   Neurocritical care time:  80 minutes for evaluation and management of code stroke. Patient is critically ill / has a very high risk of deterioration    Reason for consult: code stroke    Chief Complaint   ______________________________________________  Right sided weakness and aphasia  History is obtained from the electronic health record and emergency department physician    History of Present Illness   ______________________________________________  Fouzia Beltrán is a 61 year old female who presented with right sided weakness and aphasia. Symptoms started when she woke up with right arm numbness. She has had dizziness since the previous admission for stroke. She woke up again today dizzy, looking mostly to the right, also developed trouble with speech. EMS noted right facial droop. Recently admitted on 8/15/17 with right pontine stroke and recent right occipital infarct. Therefore she is not a candidate for IV tPA.     On exam, patient has severe dysarthria, right sided weakness, difficulty following commands, dysconjugate gaze, with right eye upward deviation.  CTA  demonstrated a filling defect in the top of the basilar artery. It was also present to lesser degree on previous CTA.    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  Will send to IR for further evaluation and admit to ICU for monitoring.  After MRI and fluid administration, double vision and weakness resolved.    Past Medical History    ______________________________________________  History reviewed. No pertinent past medical history.  Past Surgical History   ______________________________________________  History reviewed. No pertinent surgical history.  Prior to Admission Medications   ______________________________________________  Prior to Admission Medications   Prescriptions Last Dose Informant Patient Reported? Taking?   aspirin EC 81 MG EC tablet 8/29/2017  No Yes   Sig: Take 1 tablet (81 mg) by mouth daily   lisinopril (PRINIVIL/ZESTRIL) 2.5 MG tablet 8/29/2017  No Yes   Sig: Take 1 tablet (2.5 mg) by mouth daily   multivitamin, therapeutic with minerals (THERA-VIT-M) TABS tablet 8/29/2017 Self Yes Yes   Sig: Take 1 tablet by mouth daily   simvastatin (ZOCOR) 20 MG tablet 8/29/2017  No Yes   Sig: Take 1 tablet (20 mg) by mouth At Bedtime      Facility-Administered Medications: None     Allergies   No Known Allergies    Social History   ______________________________________________  Social History     Social History     Marital status:      Spouse name: N/A     Number of children: N/A     Years of education: N/A     Social History Main Topics     Smoking status: Current Some Day Smoker     Smokeless tobacco: Never Used     Alcohol use Yes     Drug use: No     Sexual activity: Not Asked     Other Topics Concern     None     Social History Narrative       Family History   ______________________________________________  Reviewed and not felt to be contributory.     Review of Systems  "  ______________________________________________  Review of systems is not obtainable due to patient factors - aphasia      Physical Exam   ______________________________________________  Weight:175 lbs 0 oz; Height:5' 4\"  Temp: 98.2  F (36.8  C) Temp src: Temporal BP: 143/82 Pulse: 61 Heart Rate: 62 Resp: 16 SpO2: 97 %      General Appearance:  No acute distress  Neuro:       Mental Status Exam:   Awake, alert, unable to assess orientation. Aphasic, difficulty following commands. Mental status is normal       Cranial Nerves:  Pupils 3 mm, reactive. Dysconjugate gaze. Mild right facial droop.Tongue and uvula are midline. Other CN are normal           Motor:  Mild right sided weakness. Tone and bulk are normal           Reflexes:  Normal DTR. Toes equivocal.        Sensory:  Unable to assess                  Coordination:   Unable to assess       Gait:  Unable to assess  Neck: no nuchal rigidity, normal thyroid. No carotid bruits.    Cardiovascular: Regular rate and rhythm, no m/r/g  Lungs: Clear to auscultation  Abdomen: Soft, not tender, not distended  Extremities: No clubbing, no cyanosis, no edema    On the second examination after MRI brain, patient's disconjugate gaze resolved, her right UE weakness also has resolved.     Data   ______________________________________________  All Data personally reviewed:       Labs:   CBC RESULTS:       Recent Labs  Lab 08/30/17  0812   WBC 11.8*   RBC 4.48   HGB 15.3   HCT 45.1        Basic Metabolic Panel:   Recent Labs   Lab Test  08/30/17   0812  08/16/17   0729  08/15/17   1215   NA  139  142  138   POTASSIUM  4.1  4.0  3.6   CHLORIDE  107  109  103   CO2  27  23  24   BUN  12  9  10   CR  0.80  0.78  0.76   GLC  115*  106*  104*   ELSA  9.4  8.4*  9.4     Liver panel:  Recent Labs   Lab Test  08/16/17   0729   PROTTOTAL  6.1*   ALBUMIN  3.0*   BILITOTAL  0.9   ALKPHOS  51   AST  10   ALT  20     INR:  Recent Labs   Lab Test  08/30/17   0812  08/15/17   1215   INR  " 0.96  0.92      Lipid Profile:  Recent Labs   Lab Test  08/15/17   1705   CHOL  242*   HDL  78   LDL  127*   TRIG  186*     Thyroid Panel:  Recent Labs   Lab Test  08/15/17   1705   TSH  1.69      Vitamin B12: No lab results found.   Vitamin D level:   Recent Labs   Lab Test  08/15/17   1705   VITDT  21     A1C:   Recent Labs   Lab Test  08/15/17   1705   A1C  5.2     Troponin I:   Recent Labs   Lab Test  08/16/17   0030  08/15/17   1705   TROPI  <0.015  <0.015     Ammonia: No lab results found.  CK: No lab results found.     CRP inflammation: No lab results found.  ESR: No lab results found.    ANDRIA: No lab results found.    ANCA: No lab results found.   Drug Screen: No lab results found.  Alcohol level:No lab results found.  UA Results:  No lab results found.  Most Recent 6 Bacteria Isolates From Any Culture (See EPIC Reports for Culture Details):No lab results found.         Imaging:   All imaging studies were reviewed personally  CT head 8/30/17:   --2 cm old right occipital lobe infarct and central/left paramedian old pontine infarct as noted on MR of 8/15/2017 and CT of 8/15/2017.  No hemorrhage, mass lesion, or focal area of acute infarction identified. Paranasal sinuses are normal. No bony abnormality. Mild atrophy.     CTA neck/head:  --Filling defect in top of the basilar artery. This was also present on CTA from early August    MRI brain:   1. Recent areas of infarction in the shellie and lower midbrain. These  have increased in extent in the interval.  2. Recent areas of infarction in the thalami left more than right.  These are new since the prior study.

## 2017-08-30 NOTE — IR NOTE
Interventional Radiology Intra-procedural Nursing Note     Patient Name:  Fouzia Beltrán    Medical Record Number: [id number]  Today's Date:  August 30, 2017  Start Time: 09:57  End of procedure time: 1340  Procedure: brain agiogram  Report given to: JOSEY Christian  Time pt departs:  1400       Notes:    Patient brought  into IR room# 3 at 09:45.      Informed consent obtained by MONY Macias MD.      Neuro assessment completed and found:  RUE weakness mild  Ataxia on right     Peripheral pulses checked and documented in Epic Flowsheet.     Patient prepped and draped in supine position with 2% Chlorhexidine.  VSS.  Monitor reads NSR with rate in the 60's.      MD first needle stick time was 10:00. A total of 10 mL of 1% lidocaine was used locally at the puncture site.    6Fr RFA Sheath Placed at:  10:02    Clot ID Time: 10:10    Penumbra on/off times:  10:28/10:31,   10:44/10:49,    11:27/11:28,  11:28/11:34,   12:20/12:24,       Reperfusion time:      Debrief was completed by MONY Macias MD.       Patient received 7.5 mg Versed and 350 mcg fentanyl for sedation during procedure. The last dose was given at 1330.      The patient tolerated the procedure well.     Neuro assessment at procedure end: unable pt intubated      A 6Fr RFA sheath was removed at 1340 and the site is covered, C/D/I at procedure end without hematoma.       Ismael Hensley RN

## 2017-08-30 NOTE — ANESTHESIA CARE TRANSFER NOTE
Patient: Fouzia Beltrán    * No procedures listed *    Diagnosis: * No pre-op diagnosis entered *  Diagnosis Additional Information: No value filed.    Anesthesia Type:   No value filed.     Note:  Airway :ETT  Patient transferred to:ICU        Vitals: (Last set prior to Anesthesia Care Transfer)              Electronically Signed By: SHUKRI Garcia CRNA  August 30, 2017  1:58 PM

## 2017-08-30 NOTE — IP AVS SNAPSHOT
` [place] [address] ONCOLOGY: [phone]            Medication Administration Report for Fouzia Beltrán as of 09/03/17 1545   Legend:    Given Hold Not Given Due Canceled Entry Other Actions    Time Time (Time) Time  Time-Action       Inactive    Active    Linked        Medications 08/28/17 08/29/17 08/30/17 08/31/17 09/01/17 09/02/17 09/03/17    acetaminophen (TYLENOL) tablet 650 mg  Dose: 650 mg Freq: EVERY 4 HOURS PRN Route: PO  PRN Reason: mild pain  Start: 09/01/17 2157   Admin Instructions: Maximum acetaminophen dose from all sources = 75 mg/kg/day not to exceed 4 grams/day.              Or  acetaminophen (TYLENOL) Suppository 650 mg  Dose: 650 mg Freq: EVERY 4 HOURS PRN Route: RE  PRN Reason: mild pain  Start: 09/01/17 2157   Admin Instructions: Maximum acetaminophen dose from all sources = 75 mg/kg/day not to exceed 4 grams/day.               glycopyrrolate (ROBINUL) injection 0.2-0.4 mg  Dose: 0.2-0.4 mg Freq: EVERY 4 HOURS PRN Route: IV  PRN Reason: other  PRN Comment: secretions  Start: 09/01/17 0841        0851 (0.2 mg)-Given       1547 (0.2 mg)-Given       1940 (0.4 mg)-Given       2349 (0.4 mg)-Given        0602 (0.4 mg)-Given       1018 (0.2 mg)-Given       1407 (0.2 mg)-Given       1854 (0.2 mg)-Given       2337 (0.4 mg)-Given        0502 (0.4 mg)-Given       1508 (0.4 mg)-Given           glycopyrrolate (ROBINUL) tablet 2 mg  Dose: 2 mg Freq: EVERY 4 HOURS PRN Route: PO  PRN Reason: other  PRN Comment: secretions  Start: 09/01/17 0841   Admin Instructions: may also be given via G-tube or J-tube if needed               hypromellose-dextran (ARTIFICAL TEARS) ophthalmic solution 1-2 drop  Dose: 1-2 drop Freq: EVERY 8 HOURS PRN Route: Both Eyes  PRN Reason: dry eyes  Start: 09/01/17 0842   Admin Instructions: Offer every shift.               morphine (PF) injection 1-2 mg  Dose: 1-2 mg Freq: EVERY 1 HOUR PRN Route: IV  PRN Reason: moderate to severe pain  PRN Comment: or dyspnea  Start:  09/02/17 1003         1025 (2 mg)-Given       1133 (2 mg)-Given [C]       1843 (2 mg)-Given       2332 (2 mg)-Given        0223 (2 mg)-Given       0502 (2 mg)-Given       0725 (2 mg)-Given           morphine solution 5-10 mg  Dose: 5-10 mg Freq: EVERY 2 HOURS PRN Route: PO  PRN Reason: moderate to severe pain  PRN Comment: or dyspnea  Start: 09/02/17 1003                           Or  morphine sulfate HIGH CONCENTRATE (ROXANOL *CONCENTRATED*) 100 MG/5ML (HIGH CONC) solution 5-10 mg  Dose: 5-10 mg Freq: EVERY 2 HOURS PRN Route: SL  PRN Reasons: moderate to severe pain,other  PRN Comment: or dyspnea  Start: 09/02/17 1003   Admin Instructions: Caution: Solution is 10 times more concentrated than standard solution. Verify dose volume.           1235 (10 mg)-Given       1424 (10 mg)-Given           naloxone (NARCAN) injection 0.1-0.4 mg  Dose: 0.1-0.4 mg Freq: EVERY 2 MIN PRN Route: IV  PRN Reason: opioid reversal  Start: 09/02/17 1010   Admin Instructions: For respiratory rate LESS than or EQUAL to 8.  Partial reversal dose:  0.1 mg titrated q 2 minutes for Analgesia Side Effects Monitoring Sedation Level of 3 (frequently drowsy, arousable, drifts to sleep during conversation).Full reversal dose:  0.4 mg bolus for Analgesia Side Effects Monitoring Sedation Level of 4 (somnolent, minimal or no response to stimulation).               ondansetron (ZOFRAN-ODT) ODT tab 4 mg  Dose: 4 mg Freq: EVERY 6 HOURS PRN Route: PO  PRN Reasons: nausea,vomiting  Start: 09/01/17 0841   Admin Instructions: This is Step 1 of nausea and vomiting management.  If nausea not resolved in 15 minutes, go to Step 2 prochlorperazine (COMPAZINE). Do not push through foil backing. Peel back foil and gently remove. Place on tongue immediately. Administration with liquid unnecessary              Or  ondansetron (ZOFRAN) injection 4 mg  Dose: 4 mg Freq: EVERY 6 HOURS PRN Route: IV  PRN Reasons: nausea,vomiting  Start: 09/01/17 0841   Admin Instructions:  This is Step 1 of nausea and vomiting management.  If nausea not resolved in 15 minutes, go to Step 2 prochlorperazine (COMPAZINE).  Irritant.               Reason statin medication order not selected  Freq: DOES NOT GO TO MAR Route: CO  PRN Reason: other  Start: 09/03/17 1040   Admin Instructions: hospice               sodium chloride (PF) 0.9% PF flush 3 mL  Dose: 3 mL Freq: EVERY 8 HOURS Route: IK  Start: 09/02/17 0800         1007 (3 mL)-Given       1758 (3 mL)-Given       2340 (3 mL)-Given        0725 (3 mL)-Given       [ ] 1600           sodium chloride (PF) 0.9% PF flush 3 mL  Dose: 3 mL Freq: EVERY 8 HOURS Route: IK  Start: 09/02/17 0800         1007 (3 mL)-Given       1758 (3 mL)-Given       2340 (3 mL)-Given        0726 (3 mL)-Given       [ ] 1600          Completed Medications  Medications 08/28/17 08/29/17 08/30/17 08/31/17 09/01/17 09/02/17 09/03/17         Dose: 1,000 mg Freq: ONCE Route: IV  Last Dose: 1,000 mg (09/01/17 2233)  Start: 09/01/17 2200   End: 09/01/17 2248   Admin Instructions: Maximum acetaminophen dose from all sources = 75 mg/kg/day not to exceed 4 grams/day.         2233 (1,000 mg)-New Bag            Discontinued Medications  Medications 08/28/17 08/29/17 08/30/17 08/31/17 09/01/17 09/02/17 09/03/17         Rate: 100 mL/hr Freq: CONTINUOUS Route: IV  Last Dose: Stopped (09/01/17 0856)  Start: 08/30/17 1545   End: 09/01/17 0815      1649 ( )-Rate/Dose Verify       1938 ( )-New Bag        0635 ( )-New Bag       0748 ( )-Rate/Dose Verify       1635 ( )-New Bag        0157 ( )-New Bag       0750 ( )-Rate/Dose Verify       0815-Med Discontinued  0856-Stopped               Dose: 650 mg Freq: EVERY 4 HOURS PRN Route: RE  PRN Reason: mild pain  Start: 08/30/17 1535   End: 09/01/17 0815   Admin Instructions: Alternate ibuprofen (if ordered) with acetaminophen.  Maximum acetaminophen dose from all sources = 75 mg/kg/day not to exceed 4 grams/day.         0815-Med Discontinued           Dose:  650 mg Freq: EVERY 4 HOURS PRN Route: PO  PRN Reason: mild pain  Start: 08/30/17 1535   End: 09/01/17 0815   Admin Instructions: Alternate ibuprofen (if ordered) with acetaminophen.  Maximum acetaminophen dose from all sources = 75 mg/kg/day not to exceed 4 grams/day.         0815-Med Discontinued           Dose: 81 mg Freq: DAILY Route: PO  Start: 08/30/17 1545   End: 09/01/17 0815   Admin Instructions: On admission, then daily.       (1716)-Not Given        0935 (81 mg)-Given        0815-Med Discontinued           Dose: 10 mg Freq: DAILY PRN Route: RE  PRN Reason: constipation  Start: 08/30/17 1546   End: 09/01/17 0815   Admin Instructions: Hold for loose stools.  This is the third step of a three step constipation treatment protocol.         0815-Med Discontinued           Dose: 1 g Freq: EVERY 24 HOURS Route: IV  Indications of Use: ASPIRATION PNEUMONIA  Start: 08/30/17 1630   End: 09/01/17 0815      1818 (1 g)-New Bag        1634 (1 g)-New Bag        0815-Med Discontinued           Dose: 15 mL Freq: EVERY 12 HOURS Route: MT  Start: 08/30/17 2000   End: 09/01/17 0815   Admin Instructions: Do not swallow.  Discontinue when extubated.       2059 (15 mL)-Given        0935 (15 mL)-Given       2054 (15 mL)-Given        0815-Med Discontinued                 Dose: 2,000 Units Freq: DAILY Route: PO  Start: 08/30/17 1545   End: 09/01/17 0815      (1716)-Not Given        0935 (2,000 Units)-Given        0815-Med Discontinued           Dose: 100 mg Freq: DAILY Route: PO  Start: 08/30/17 1545   End: 09/01/17 0815      1653-Hold [C]        0935 (100 mg)-Given        0815-Med Discontinued           Dose: 20 mg Freq: EVERY 12 HOURS Route: IV  Start: 08/30/17 1900   End: 09/01/17 0815      2059 (20 mg)-Given        0934 (20 mg)-Given       2053 (20 mg)-Given        0815-Med Discontinued                 Rate: 9 mL/hr Freq: CONTINUOUS Route: IV  Last Dose: Stopped (09/01/17 0856)  Start: 08/30/17 1515   End: 09/01/17 0815   Admin  Instructions: NO Bolus doses  Goal level of: 0.15-0.25 IU/mL  Heparin 10a(Xa) = 0.26 - continue at 900 units/hr and recheck 9/2 am  Heparin dosed per [place] Protocol. Monitor platelets every three days while on anticoagulation therapy.       1517 (800 Units/hr)-New Bag       2222 (900 Units/hr)-Rate/Dose Change        0637 (1,200 Units/hr)-Rate/Dose Change       0748 (1,200 Units/hr)-Rate/Dose Verify       0800 (1,200 Units/hr)-Rate/Dose Verify       1228 (1,200 Units/hr)-New Bag       1356 (1,000 Units/hr)-Rate/Dose Change       1600 (1,000 Units/hr)-Rate/Dose Verify       2145 (900 Units/hr)-Rate/Dose Change        0442 (900 Units/hr)-Rate/Dose Verify       0750 (900 Units/hr)-Rate/Dose Verify       0815-Med Discontinued  0856-Stopped               Dose: 10-20 mg Freq: EVERY 1 HOUR PRN Route: IV  PRN Reasons: high blood pressure,other  PRN Comment: for Systolic Blood Pressure greater than 200 mmHg or Diastolic Blood Pressure greater than 110 mmHg  Start: 08/30/17 1535   End: 09/01/17 0815   Admin Instructions: USE if Heart Rate less than 60 bpm upon antihypertensive initiation or Heart Rate falls to less than 60 bpm during labetalol.  Give 10 mg, wait 30 minutes. If not effective then repeat 10 mg. Wait 1 hour. If not effective then give 20 mg.  May add prn enalaprilat (if ordered) if Systolic Blood Pressure parameter is not met after reaching hydralazine 20 mg. Notify provider within 1 hour if Blood Pressure parameters are not met.       1806 (10 mg)-Given         0125 (20 mg)-Given       0815-Med Discontinued           Dose: 0.3-0.5 mg Freq: EVERY 2 HOURS PRN Route: IV  PRN Reason: severe pain  Start: 08/30/17 1546   End: 09/01/17 0815   Admin Instructions: Hold while on PCA.       1622 (0.5 mg)-Given       2328 (0.5 mg)-Given         0129 (0.5 mg)-Given       0815-Med Discontinued           Dose: 10-40 mg Freq: EVERY 10 MIN PRN Route: IV  PRN Reasons: high blood pressure,other  PRN Comment: for Systolic Blood  Pressure greater than 200 mmHg or Diastolic Blood Pressure greater than 110 mmHg  Start: 08/30/17 1535   End: 09/01/17 0815   Admin Instructions: Use if Heart Rate 60 bpm or greater upon antihpertensive initiation.  Hold if Heart Rate less than 60 bpm.  Increase or repeat the dose if Blood Pressure goal not met. Give 10 mg, wait 10 minutes.  If not effective then give 20 mg. Wait 10 min.  If not effective then give 40 mg.  May add prn hydrazaline (if ordered) if Systolic Blood Pressure parameter is not met after reaching labetalol 40mg.  If hydrALAZINE not ordered can use enalapril (if ordered) (MAX daily dose: 300 mg / 24 hrs)   Notify provider within 1 hour if Blood Pressure parameters are not met.         0815-Med Discontinued           Dose: 2.5 mg Freq: DAILY Route: PO  Start: 08/30/17 1545   End: 09/01/17 0815      (1716)-Not Given        0935 (2.5 mg)-Given        0815-Med Discontinued           Freq: CONTINUOUS PRN Route: XX  Start: 08/30/17 1549   End: 09/01/17 0815   Admin Instructions: No D5W IV solutions unless patient hypoglycemic.  Pharmacy to remove all dextrose from iv fluid orders as able.         0815-Med Discontinued           Freq: CONTINUOUS PRN Route: XX  Start: 08/30/17 1535   End: 09/01/17 0756   Admin Instructions: No D5W IV solutions unless patient hypoglycemic.  Pharmacy to remove all dextrose from iv fluid orders as able.         0756-Med Discontinued           Dose: 5-10 mg Freq: EVERY 10 MIN PRN Route: IV  PRN Reason: other  PRN Comment: for up to 30 minutes after initial dose,  for initial management of distress following extubation  Start: 09/01/17 0816   End: 09/02/17 1003   Admin Instructions: Symptoms of distress may include:  moderate-severe use of accessory muscles, nasal flaring, gasping or increased respiratory effort, increased agitation, restlessness, grimacing, splinting, diaphoresis, increases in heart rate, respiratory rate, or blood pressure.         0906 (8 mg)-Given         1003-Med Discontinued       And    Dose: 5-10 mg Freq: EVERY 30 MIN PRN Route: IV  PRN Reason: other  PRN Comment: for ongoing management of distress  Start: 09/01/17 0816   End: 09/02/17 1003   Admin Instructions: Symptoms may include:  moderate-severe use of accessory muscles, nasal flaring, gasping or increased respiratory effort, increased agitation, restlessness, grimacing, splinting, diaphoresis, increases in heart rate, respiratory rate, or blood pressure.         1233 (5 mg)-Given        0602 (5 mg)-Given [C]       1003-Med Discontinued          Dose: 0.1-0.4 mg Freq: EVERY 2 MIN PRN Route: IV  PRN Reason: opioid reversal  Start: 08/30/17 1535   End: 09/01/17 0815   Admin Instructions: For respiratory rate LESS than or EQUAL to 8.  Partial reversal dose:  0.1 mg titrated q 2 minutes for Analgesia Side Effects Monitoring Sedation Level of 3 (frequently drowsy, arousable, drifts to sleep during conversation).Full reversal dose:  0.4 mg bolus for Analgesia Side Effects Monitoring Sedation Level of 4 (somnolent, minimal or no response to stimulation).         0815-Med Discontinued           Dose: 4 mg Freq: EVERY 6 HOURS PRN Route: PO  PRN Reasons: nausea,vomiting  Start: 08/30/17 1535   End: 09/01/17 0815   Admin Instructions: This is Step 1 of nausea and vomiting management.  If nausea not resolved in 15 minutes, go to Step 2 prochlorperazine (COMPAZINE). Do not push through foil backing. Peel back foil and gently remove. Place on tongue immediately. Administration with liquid unnecessary         0815-Med Discontinued        Or    Dose: 4 mg Freq: EVERY 6 HOURS PRN Route: IV  PRN Reasons: nausea,vomiting  Start: 08/30/17 1535   End: 09/01/17 0815   Admin Instructions: This is Step 1 of nausea and vomiting management.  If nausea not resolved in 15 minutes, go to Step 2 prochlorperazine (COMPAZINE).  Irritant.         0815-Med Discontinued           Freq: CONTINUOUS PRN Route: XX  Start: 08/30/17 1546   End:  09/01/17 0815        0815-Med Discontinued           Dose: 17 g Freq: DAILY PRN Route: PO  PRN Reason: constipation  Start: 08/30/17 1535   End: 09/01/17 0815   Admin Instructions: Give in 8oz of  water, juice, or soda. Hold for loose stools.  This is the second step of a three step constipation treatment protocol.  1 Packet = 17 grams. Mixed prescribed dose in 8 ounces of water. Follow with 8 oz. of water.         0815-Med Discontinued           Dose: 5-10 mg Freq: EVERY 6 HOURS PRN Route: IV  PRN Reasons: nausea,vomiting  Start: 08/30/17 1535   End: 09/01/17 0815   Admin Instructions: This is Step 2 of nausea and vomiting management.   If nausea not resolved in 15 minutes, give metoclopramide (REGLAN) if ordered (step 3 of nausea and vomiting management)         0815-Med Discontinued        Or    Dose: 5-10 mg Freq: EVERY 6 HOURS PRN Route: PO  PRN Reason: vomiting  Start: 08/30/17 1535   End: 09/01/17 0815   Admin Instructions: This is Step 2 of nausea and vomiting management.   If nausea not resolved in 15 minutes, give metoclopramide (REGLANI) if ordered (step 3 of nausea and vomiting management)         0815-Med Discontinued        Or    Dose: 25 mg Freq: EVERY 12 HOURS PRN Route: RE  PRN Reasons: nausea,vomiting  Start: 08/30/17 1535   End: 09/01/17 0815   Admin Instructions: This is Step 2 of nausea and vomiting management.   If nausea not resolved in 15 minutes, give metoclopramide (REGLAN) if ordered (step 3 of nausea and vomiting management)         0815-Med Discontinued           Rate: 2.4-35.7 mL/hr Freq: CONTINUOUS Route: IV  Last Dose: Stopped (08/31/17 0740)  Start: 08/30/17 1345   End: 09/01/17 0815   Admin Instructions: For sedation of mechanically ventilated patients.  For range orders: start at lowest dose ordered. Titrate by 5-10 mcg/kg/min every 5 minutes to achieve the defined goal sedation score. If ordered, consider using bolus doses of propofol for acute agitation before titrating infusion.        1344 (5 mcg/kg/min)-New Bag       1510 (20 mcg/kg/min)-Rate/Dose Change       1530 (30 mcg/kg/min)-Rate/Dose Change [C]       1805 (30 mcg/kg/min)-New Bag       1944 (40 mcg/kg/min)-Rate/Dose Change       2331 (40 mcg/kg/min)-New Bag        0439 (40 mcg/kg/min)-New Bag       0740-Hold        0815-Med Discontinued           Dose: 1-2 tablet Freq: 2 TIMES DAILY PRN Route: PO  PRN Comment: constipation   Start: 08/30/17 1535   End: 09/01/17 0815   Admin Instructions: If no bowel movement in 24 hours, increase to 2 tablets PO BID.  Hold for loose stools.   This is the first step of a three step constipation treatment protocol.         0815-Med Discontinued           Dose: 20 mg Freq: AT BEDTIME Route: PO  Start: 08/30/17 2200   End: 09/01/17 0815      (6948)-Not Given        2146-Hold [C]        0815-Med Discontinued      Medications 08/28/17 08/29/17 08/30/17 08/31/17 09/01/17 09/02/17 09/03/17

## 2017-08-30 NOTE — ED NOTES
Bed: ED14  Expected date:   Expected time:   Means of arrival:   Comments:  kit - 516 - 61F stroke like symptoms eta 0800

## 2017-08-30 NOTE — IP AVS SNAPSHOT
` ` Patient Information     Patient Name Sex     Jerel Fouzia DAWSON ([id number]) Female 1955       Room Bed    [facility] 8821-01      Patient Demographics     Address Phone    [address] [facility] [address] [phone] (Home)      Patient Ethnicity & Race     Ethnic Group Patient Race    American White      Emergency Contact(s)     Name Relation Home Work Mobile    Juancho Davila Spouse [phone]    Lois Daughter   [phone]      Documents on File        Status Date Received Description       Documents for the Patient    Affiliate Privacy placeholder   phase3    Consent for EHR Access Received 08/15/17     Insurance Card       External Medication Information Consent       Patient ID Received 08/15/17     [facility] Specified Other       Consent for Services/Privacy Notice - Hospital/Clinic Received 08/15/17     Privacy Notice - [place] Received 08/15/17        Documents for the Encounter    CMS IM for Patient Signature       EMS/Ambulance Record  [facility]    Discharge Attachment   (S) COMFORT MEASURES: END-OF-LIFE CARE FOR PATIENTS AND THEIR FAMILIES (ENGLISH)    Discharge Attachment   AS DEATH NEARS, HOSPICE (ENGLISH)      Admission Information     Attending Provider Admitting Provider Admission Type Admission Date/Time    Dottie Hinton DO WhiteDottie DO Emergency 17  0804    Discharge Date Hospital Service [facility]Cert [facility]    [facility] Room/Bed Admission Status       [address] 8821/8821- Admission (Confirmed)       Admission     Complaint    CVA (cerebral vascular accident) (H)      Hospital Account     Name Acct ID Class Status Primary Coverage    Fouzia Beltrán [id number] Inpatient Open None            Guarantor Account (for Hospital Account #[id number])     Name Relation to Pt Service Area Active? Acct Type    Fouzia Beltrán  FCS Yes Personal/Family     Address Phone          [address] [place] [address] [phone](H)              Coverage Information (for Hospital Account #[id number])     Not on file

## 2017-08-31 NOTE — PLAN OF CARE
Problem: Goal Outcome Summary  Goal: Goal Outcome Summary  SLP-  Pt intubated.  Not appropriate for therapy today.  Will follow.

## 2017-08-31 NOTE — PROGRESS NOTES
[facility]  [facility] [place]  Neuro-ICU Progress Note       Admission Date: 8/30/2017  Date of Service:08/31/2017   Hospital Day: 2       ADDENDUM: 8/31/2017  3:21 PM  I performed multiple evaluations through out the day. Patient is comatose with decerebrate posturing L>R. No flexion. Does not open eyes.  Pupils 3 mm OD, 5 mm OS.     Multiple discussions with the family. Patient will be DNR per family wishes    Additional neurocritical care time 30 minutes.   ----------------------------------------------------  Cm Lo MD, PhD, [facility]      _________________________________     Main Plans for Today   Stop and hold all sedation  Assessment & Plan   (I63.219,  I63.22) Acute ischemic VBA brainstem stroke, unspecified laterality (H)  --not tPA candidate due to stroke 2 weeks ago - right pontine stroke on 8/15/17  ----MRI brain with multiple new bilateral pontine, midbrain and thalamic infarcts and likely top of the basilar artery clot  ------Unsuccessful thrombolectomy with complete BA thrombosis  -------Massive brain stem strokes  -------Risk of locked-in syndrome is very high.   (Z86.73) History of stroke  --right pontine stroke on 8/15/17  (E78.2) Mixed hyperlipidemia  --Zocor  --vitamin D and CoQ10  #. (J96.00) Acute respiratory failure, unspecified whether with hypoxia or hypercapnia (HCC)  --Management per medical intensivists  --Patient is not-ready for weaning from neurological standpoint  #. Cardiovascular  --Minimally elevated troponin  #. Infection   --elevated WBC  --Management per medical intensivists.   #. Endocrine:   --Insulin protocol to keep blood sugars 100-150.   #. Heme/Onc:   --stable  #. Renal  --Sodium goal 140-155  #. Skin/Musculoskeletal:  -- No issues   #. PT/OT/Speech  --hold evaluations  #.Nutrition  --Per speech therapy evaluation   #. ICU prophylaxis:   --Stress ulcer prophylaxis Protonix  --VAP: per protocol  --DVT prophylaxis: mechanical and  "full anticoagulation      CODE STATUS: Full Code    Family update by me today: I met with family, demonstrated imaging, explained significance. Plan to stop sedation and see what patient can do neurologically.     Interval History   Patient was neurologically stable, but devastated. Off sedation, some posturing on the left, otherwise comatose.     Physical Exam     Vitals: Blood pressure 111/64, pulse 92, temperature 98.1  F (36.7  C), resp. rate 16, height 1.626 m (5' 4\"), weight 84.3 kg (185 lb 13.6 oz), SpO2 100 %.  Tmax: Temp (24hrs), Av.7  F (36.5  C), Min:95.7  F (35.4  C), Max:99.9  F (37.7  C)    Vital Signs with Ranges  Temp:  [95.7  F (35.4  C)-99.9  F (37.7  C)] 98.1  F (36.7  C)  Pulse:  [58-92] 92  Heart Rate:  [53-92] 68  Resp:  [12-22] 16  BP: (111-202)/() 111/64  FiO2 (%):  [45 %] 45 %  SpO2:  [95 %-100 %] 100 %   I&O:  I/O this shift:  In: 200 [I.V.:200]  Out: 125 [Urine:125]  I/O last 3 completed shifts:  In: 1738.69 [I.V.:1738.69]  Out: 2225 [Urine:1725; Emesis/NG output:500] I/O since admission: -411.31     General Appearance:   No acute distress  Neuro:       Mental Status Exam:  Sedated, intubated, comatose. S/L untestable due to intubation. Does not follow commands.  Mental status is decreased and affected by sedation       Cranial Nerves:  Pupils 4 mm OD, 5 mm OS both sluggish. Occulocephalis reflexes are absent.  Corneal reflexes is decreased. Face symmetric. Gag/Cough reflex present. Other CN are untestable           Motor:  Posturing on the left, plegic on the right       Reflexes:  Low DTR, toes equivocal       Sensory:  Untestable                    Coordination:   Untestable       Gait:  Untestable  Cardiovascular: Regular rate and rhythm, no m/r/g  Lungs: Ventilation Mode: CMV/AC  FiO2 (%): 45 %  Rate Set (breaths/minute): 16 breaths/min  Tidal Volume Set (mL): 500 mL  PEEP (cm H2O): 5 cmH2O  Oxygen Concentration (%): 45 %  Resp: 16  Both hemithoraces are symmetrical, " auscultation of lungs revealed no bronchovesicular sounds, expirium prolongation, wheezing, rhonci and crackles  Abdomen: Soft, not tender, not distended  Skin/Extremities: No clubbing, cyanosis, no edema    Medications   Infusions medications:    - MEDICATION INSTRUCTIONS -       NaCl 100 mL/hr at 08/31/17 0748     propofol (DIPRIVAN) infusion Stopped (08/31/17 0740)     HEParin 1,200 Units/hr (08/31/17 0748)     - MEDICATION INSTRUCTIONS -       - MEDICATION INSTRUCTIONS -       Schedule medications:    co-enzyme Q-10  100 mg Oral Daily     cholecalciferol  2,000 Units Oral Daily     lisinopril  2.5 mg Oral Daily     simvastatin  20 mg Oral At Bedtime     aspirin EC  81 mg Oral Daily     chlorhexidine  15 mL Mouth/Throat Q12H     cefTRIAXone  1 g Intravenous Q24H     famotidine  20 mg Intravenous Q12H     PRN medications:naloxone, - MEDICATION INSTRUCTIONS -, acetaminophen, acetaminophen, senna-docusate, polyethylene glycol, ondansetron **OR** ondansetron, prochlorperazine **OR** prochlorperazine **OR** prochlorperazine, labetalol, hydrALAZINE, - MEDICATION INSTRUCTIONS -, HYDROmorphone, bisacodyl, - MEDICATION INSTRUCTIONS -  Data       Labotary Data:   All data was reviewed by me personally  CBC RESULTS:  Recent Labs   Lab Test  08/31/17   0435  08/30/17   1614  08/30/17   0812  08/16/17   0729  08/15/17   1215   WBC  11.3*  17.0*  11.8*  8.6  10.5   RBC  3.39*  3.91  4.48  4.24  4.61   HGB  11.4*  13.5  15.3  14.4  16.1*   HCT  33.9*  39.2  45.1  42.7  46.2   PLT  271  229  320  254  298     Basic Metabolic Panel:  Recent Labs   Lab Test  08/31/17   0435  08/30/17   0812  08/16/17   0729  08/15/17   1215   NA  143  139  142  138   POTASSIUM  3.8  4.1  4.0  3.6   CHLORIDE  111*  107  109  103   CO2  24  27  23  24   BUN  7  12  9  10   CR  0.67  0.80  0.78  0.76   GLC  95  115*  106*  104*   ELSA  8.1*  9.4  8.4*  9.4     ABG:  Recent Labs   Lab Test  08/30/17   1641   PH  7.37   PCO2  37   PO2  156*   HCO3  21    O2PER  45%     Liver panel:  Recent Labs   Lab Test  08/16/17   0729   PROTTOTAL  6.1*   ALBUMIN  3.0*   BILITOTAL  0.9   ALKPHOS  51   AST  10   ALT  20     Procalcitonin: No lab results found.  Coagulation  Recent Labs   Lab Test  08/31/17   0435  08/30/17   2141  08/30/17   0812  08/15/17   1215   INR   --    --   0.96  0.92   PTT   --    --   32   --    AXA  <0.10  0.13   --    --       Lipid Profile:  Recent Labs   Lab Test  08/15/17   1705   CHOL  242*   HDL  78   LDL  127*   TRIG  186*     Thyroid Panel:  Recent Labs   Lab Test  08/15/17   1705   TSH  1.69      Vitamin B12: No lab results found.   Vitamin D:  Recent Labs   Lab Test  08/15/17   1705   VITDT  21     A1C:   Recent Labs   Lab Test  08/15/17   1705   A1C  5.2     Troponin I:   Recent Labs   Lab Test  08/31/17   0435  08/30/17   2350  08/30/17   1614  08/16/17   0030  08/15/17   1705   TROPI  0.072*  0.037  <0.015  <0.015  <0.015     CPK: No lab results found.  Ammonia: No lab results found.  Serum Osmolality:No lab results found.  Most Recent 6 Bacteria Isolates From Any Culture (See EPIC Reports for Culture Details):No lab results found.  Alcohol level:No lab results found.  Drug Screen: No lab results found.  UA Results:  No lab results found.       Cardiac US:   --     Neurophysiology:   --     Imaging:   All imaging studies were reviewed personally  CT head:   1. Atrophy and old infarcts as described.  2. Nothing acute.  3. CT angiogram to follow. 4 no interval change.  CT perfusion  Brainstem and bithalamic perfusion defects.  CT angiography neck/head  1. Distal basilar and basilar tip not well seen. Clot or severe stenosis could be present. This is more evident today than on the  prior exam.  2. No stenosis at either carotid bifurcation.  3. Findings discussed with Dr. Lo. Conventional angiogram might be of value for further evaluation of the [place].  MRI brain 8/30/17  1. Recent areas of infarction in the shellie and lower  midbrain. These have increased in extent in the interval.  2. Recent areas of infarction in the thalami left more than right. These are new since the prior study.    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 No filling of the right PCA or left SCA.  Despite multiple attempts with various reperfusion catheters and a Solitaire stent retriever, the basilar artery remained occluded.    MRI brain 8/31/17:   Multiple areas of restricted diffusion consistent with areas of infarction. Area of infarction in the brainstem, midbrain,  and thalamic regions are better defined and have increased in size when compared to 8/30/2017.        Time Spent on this Encounter      Time:   Neurocritical care time:  I spent 50 minutes bedside and on the inpatient unit today managing the neurocritical care of Fouzia Beltrán in relation to the issues listed in this note. Patient is critically ill / has very high risk of deterioration

## 2017-08-31 NOTE — PROGRESS NOTES
[facility]    Critical Care Service  Progress Note    Date of Service (when I saw the patient): 08/31/2017     Assessment & Plan     Fouzia Beltrán is a 61 year old female who was admitted on 8/30/2017.      Main Plans for Today        Stop sedation and assess neuro status     Neuro  1. Basilar artery occlusion, multiple new brain stem strokes  2. Failed recannulization in IR, complicated procedure by pt vomiting and sudden decreased mentation  3. MRI 8/31 -  Grim prognosis per neuro - Increased areas of restricted diffusion/infarction in brainstem, midbrain, and thalamic regions.   4. Sedation   Plan:  1. Neuro critical care following  2. Heparin gtt initiated  3. Stop sedation on 8/31 AM     CV  1. Hx of HTN - on lisinopril at home  2. Keep BP<180   3. Troponin mildly elevated  Plan:  -- Labetalol and hydralazine IV PRN      Resp:  1. Post IR procedure ventilator management  2. Acute respiratory failure - likely secondary to basilar artery occlusion and probable aspiration during IR procedure  3. Troponin likely a stress/demand issue - is currently on heparin. Will continue to trend  Plan:  -- Remain vented until neurologically stable for extubation  -- Current settings are: CMV 45%  rate 16 PEEP 5  -- Remain intubated until neurologically stable to wean from vent     GI/Nutrition  1. No prior hx  2. Emesis episode in IR  Plan:  -- NPO, OG placed to LIS  -- GI prophylaxis - famotidine BID  -- PRN zofran     Renal  1. No prior hx.  Baseline creatnine appears to be 0.76  2. Sodium goal 140-155 per neuro  Plan:  -- Monitor I/O closely with awan catheter  --UO adequate     ID  1. Probable aspiration during IR procedure  Plan:   --Rocephin 1g IV daily     Endocrine  1. Monitor for stress Hyperglycemia  Plan:  --  Insulin gtt per protocol if indicated  -- Keep BG  <150 for optimal healing     Heme:  1. No current issues  Plan:  -- Monitor hemoglobin.  -- Transfuse to keep > 7.0     MSK  1. No  issues  Plan:     Skin  1. No issues   Plan:    General cares:  DVT Prophylaxis: heparin gtt  GI Prophylaxis: H2 Blocker  Restraints: Restraints for medical healing needed: NO  Family update by me today: Yes   Current lines are required for patient management  Access: None    Time Spent on this Encounter   Billing:  I spent 30 minutes bedside and on the inpatient unit today managing the critical care of Fouzia Beltrán in relation to the issues listed in this note.    Interval History   The patient was stable hemodynamically overnight, remained intubated on propofol, PRN hydromorphone. The plan remains to remove sedation x 24 hours and assess her neurological status.     Physical Exam   Temp: 98.1  F (36.7  C) Temp src: Bladder Temp  Min: 95.7  F (35.4  C)  Max: 99.9  F (37.7  C) BP: 111/64 Pulse: 92 Heart Rate: 68 Resp: 16 SpO2: 100 % O2 Device: Mechanical Ventilator Oxygen Delivery: 3 LPM  Vitals:    08/30/17 0807 08/31/17 0400   Weight: 79.4 kg (175 lb) 84.3 kg (185 lb 13.6 oz)     I/O last 3 completed shifts:  In: 1738.69 [I.V.:1738.69]  Out: 2225 [Urine:1725; Emesis/NG output:500]    GEN: Pt is lying in bed, eyes closed, HOB at 30-45 deg.  EYES: Pupils unequal, irregular, L>R (5mm, 4mm respectively), fixed. Anicteric sclera.   HEENT:  Normocephalic, atraumatic, trachea midline, ETT secure  CV: RRR, no gallops, rubs, or murmurs  PULM/CHEST: Coarse breath sounds bilaterally without wheeze, symmetric chest rise  GI: normal bowel sounds, soft, no masses  : awan catheter in place, urine yellow and clear  EXTREMITIES: No peripheral edema, peripheral pulses intact  NEURO: Sedated/intubated, unresponsive - minimal eval d/t sedation/intubation. Facial structures appear symmetric, gag/cough positive, does not follow commands, and demonstrates decerebrate posturing when UE stimulated. Does not follow any commands.   SKIN: No rashes, sores or ulcerations  PSYCH:  Affect: sedated  Imaging personally reviewed:  MRI, CT  brain, CXR    Medications     - MEDICATION INSTRUCTIONS -       NaCl 100 mL/hr at 08/31/17 0748     propofol (DIPRIVAN) infusion Stopped (08/31/17 0740)     HEParin 1,200 Units/hr (08/31/17 0748)     - MEDICATION INSTRUCTIONS -       - MEDICATION INSTRUCTIONS -         co-enzyme Q-10  100 mg Oral Daily     cholecalciferol  2,000 Units Oral Daily     lisinopril  2.5 mg Oral Daily     simvastatin  20 mg Oral At Bedtime     aspirin EC  81 mg Oral Daily     chlorhexidine  15 mL Mouth/Throat Q12H     cefTRIAXone  1 g Intravenous Q24H     famotidine  20 mg Intravenous Q12H       Data     Recent Labs  Lab 08/31/17  0435 08/30/17  2350 08/30/17  1614 08/30/17  0812   WBC 11.3*  --  17.0* 11.8*   HGB 11.4*  --  13.5 15.3     --  100 101*     --  229 320   INR  --   --   --  0.96     --   --  139   POTASSIUM 3.8  --   --  4.1   CHLORIDE 111*  --   --  107   CO2 24  --   --  27   BUN 7  --   --  12   CR 0.67  --   --  0.80   ANIONGAP 8  --   --  5   ELSA 8.1*  --   --  9.4   GLC 95  --   --  115*   TROPI 0.072* 0.037 <0.015  --      Recent Results (from the past 24 hour(s))   IR Carotid Cerebral Angiogram Bilateral    Narrative    [facility]   INTERVENTIONAL NEURORADIOLOGY   PROCEDURAL NOTE    1. ENDOVASCULAR MECHANICAL THROMBECTOMY OF A BASILAR ARTERY  THROMBOEMBOLIC OCCLUSION USING THE PENUMBRA REPERFUSION DEVICE AND THE  SOLITAIRE STENT RETRIEVER DEVICE  2. CEREBRAL ANGIOGRAM: SELECTIVE CATHETERIZATION OF THE LEFT VERTEBRAL  ARTERY WITH ANGIOGRAPHIC IMAGING CENTERED OVER THE HEAD   3. CEREBRAL ANGIOGRAM: SELECTIVE CATHETERIZATION OF THE RIGHT COMMON  CAROTID ARTERY WITH ANGIOGRAPHIC IMAGING CENTERED OVER THE HEAD   4. CEREBRAL ANGIOGRAM: SELECTIVE CATHETERIZATION OF THE LEFT COMMON  CAROTID ARTERY WITH ANGIOGRAPHIC IMAGING CENTERED OVER THE HEAD     INDICATION: History of dizziness, weakness, slurred speech, and  possible basilar tip occlusion identified on CTA. Angiography to be  performed for  further assessment and possible endovascular mechanical  thrombectomy.     CONSENT: Cerebral angiography and endovascular mechanical thrombectomy  were discussed in detail. The procedures' indications, major risks,  benefits, and alternatives were discussed. Risks including, but not  limited to, arterial perforation or dissection, stroke (including  reperfusion hemorrhage), and cardiac or pulmonary complications of  sedation, were discussed. Understanding was acknowledged, and informed  consent was obtained.      MODERATE SEDATION: Versed 7.5 mg. Fentanyl 350 mcg. The procedure was  performed with administration intravenous conscious sedation with  appropriate preoperative, intraoperative, and postoperative  evaluation. 235 minutes of supervised face to face conscious sedation  time was provided by a radiology nurse under my direct supervision.     MEDICATIONS:   Versed 7.5 mg IV   Fentanyl 300 mcg IV     CONTRAST: 185 cc Omnipaque 300     FLUOROSCOPIC TIME: 94.2 minutes     DOSE: Air Kerma: 6205 mGy     ESTIMATED BLOOD LOSS: 100 cc     PERFORMING PHYSICIAN(S):   KAYLEIGH Kinney M.D.    PROCEDURE: The patient was placed supine upon the neuroangiography  table. The skin of both groins was prepped and draped in sterile  fashion. Under local anesthesia with lidocaine and sterile technique,  the right common femoral artery was percutaneously accessed with a 4  [place] citizen micropuncture set. A short 6 [place] citizen sheath was then placed. A 5  [place] citizen diagnostic catheter was then advanced over an angled Glidewire  into the aortic arch and used to selectively and subselectively  catheterize the left vertebral artery. Digital subtraction angiograms  of the head were performed in multiple projections with this  catheterization.     PRE-TREATMENT FINDINGS:     Images of the left vertebral artery centered over the head demonstrate  TICI0-1 basilar tip occlusion with faint filling of the left PCA  distal to a  focal stenosis. No filling of the right PCA or left SCA.  The distal basilar artery is narrowed and irregular.    PROCEDURE CONTINUED:     The diagnostic catheter was then exchanged for a Neuron MAX guiding  sheath over an exchange length Puente wire. The left vertebral artery  was tortuous, limiting the degree to which the Neuron MAX could be  advanced. The Penumbra ACE68 reperfusion catheter was then advanced  coaxially with a 3MAX ACE reperfusion catheter over a Fathom 16 wire  around the proximal tortuosity and up the left vertebral artery to the  basilar occlusion. The Neuron MAX was advanced further to the lower  cervical segment. The clot was noted to be very firm with the wire  banking off of rather than passing through on the first several  attempts. The ACE68 was positioned at the proximal clot face and  aspiration was performed. The system was removed and angiography  performed. The basilar occlusion progressed more proximally, now with  a lack of filling of the right superior cerebellar artery. This may be  secondary to intimal injury and/or propagated thrombus from  instrumentation.     A second pass was then performed using the same system with the same  result.     Next, given failure of the reperfusion catheter system to open the  occlusion, we proceeded with a Solitaire stent retriever (4 x 20 mm),  spanning the left PCA to the basilar trunk. This recanalized the  basilar artery into the left posterior cerebral artery. However, the  underlying intima appeared very irregular. The vessel spontaneously  closed again after performing a short interval follow-up angiogram.    Angiography of the carotid arteries was performed bilaterally to  assess collateral flow via a 125 cm Berenstein diagnostic catheter  through the Neuron MAX. This identified only collateral supply to the  left PCA (which had been previously cleared with the stent retriever),  but no contribution to the right PCA or basilar tip,  consistent with  unchanged occlusion.    Dr. Aponte at this time arrived as an additional  for the  procedure.    Given the severe irregularity of the vessel, we next attempted to open  the right posterior cerebral or superior cerebellar arteries with a  3MAX ACE reperfusion catheter (smallest profile). However, this was  unsuccessful.     Dr. Lo was also present and we jointly discussed the risks and  benefits of potentially placing a Wingspan stent. We decided to  attempt access for Wingspan stent placement. However, once again, the  origin of the vertebral artery contributed to significant instability  of the system and the entire system prolapsed into the aortic arch  when advancing the stenting system over the exchange length wire. Wire  positioning in the right PCA/SCA was initially confirmed following  several microangiograms. The patient additionally became nauseated  during the procedure and began vomiting, for which suction was  performed and anesthesiology called for intubation.    Given failed recanalization despite numerous attempts and significant  access challenges, we decided to conclude the procedure. The system  was removed and hemostasis achieved with manual pressure.    POST-TREATMENT FINDINGS:     Images of the left vertebral artery centered over the head demonstrate  persistent occlusion of the basilar trunk.    Images of the left common carotid artery centered over the head  demonstrate good collateral PCOM supply to the left PCA. Normal  variant infundibular origin of the PCOM.    Images of the right common carotid artery centered over the head  demonstrate no significant collateral supply to the right PCA or  basilar tip. Normal variant infundibular origin of the PCOM.    INTERVENTION DATA POINTS:   Femoral artery access: 1002 hours   Clot ID: [id number]   Aspiration start/end: 1028-31, 1044-49, 1127-28, 7031-3911, 1220-24  hours   Pretreatment TICI score: 0-1   Final TICI  score: 0    CONCLUSION:     TICI0-1 basilar tip occlusion with faint filling of the left PCA. No  filling of the right PCA or left SCA.    Despite multiple attempts with various reperfusion catheters and a  Solitaire stent retriever, the basilar artery remained occluded.    Results were discussed with the patient's family and neurology team  immediately following the procedure.     Evaluation and stenosis determination based on NASCET criteria.   _____________________________________________   Anson Mercedes M.D.   Interventional Neuroradiologist   [facility]   Office: [phone]   Pager: [phone]   Cell: [phone]       ANSON MERCEDES MD   MR Brain w/o Contrast    Narrative    MRI BRAIN WITHOUT CONTRAST August 30, 2017 2:58 PM    HISTORY: Massive stroke.    TECHNIQUE: Multiplanar, multisequence MRI of the brain without  gadolinium IV contrast material.      COMPARISON: 8/30/2017 at 0848 hours.    FINDINGS: Diffusion-weighted images show significantly increased of  areas of restricted diffusion in the posterior vascular distribution  involving both thalamic nuclei, the midbrain, shellie, and right  cerebellum as well as both occipital lobes. There is no definite  evidence for any hemorrhage. There is an old small infarct in the  right occipital lobe which is now surrounded by areas of increased  signal and restricted diffusion suggesting that there is no infarct in  this location. There is no midline shift.      Impression    IMPRESSION: Multifocal acute ischemic infarcts involving the  brainstem, thalamic nuclei, right cerebellum, and both occipital lobes  with progression since scan earlier on same day. There is no evidence  for any significant mass effect or hydrocephalus.    ANGY CARROLL MD   XR Chest Port 1 View    Narrative    CHEST PORTABLE ONE VIEW  8/30/2017 4:52 PM     HISTORY: Endotracheal tube positioning.    COMPARISON: None.      Impression    IMPRESSION: Heart size is normal.  Endotracheal tube is at the thoracic  inlet, 5 cm proximal to the reji. Esophagogastric tube courses below  the level of the diaphragm and is coiled within the gastric lumen. No  pleural effusion, pneumothorax, or abnormal area of consolidation.    CURTIS PINTO MD   MR Brain w/o Contrast    Narrative    MR BRAIN W/O CONTRAST  8/31/2017 5:40 AM     HISTORY:  new stroke    TECHNIQUE: Multiplanar, multisequence MRI of the brain without  gadolinium IV contrast material.    COMPARISON: MR scan dated 8/30/2017    FINDINGS: Diffusion-weighted images reveal a large area of restricted  diffusion in the brainstem. This is better defined than on the  previous study. This area of restricted diffusion extends both to the  right and left of midline. It measures about 2.4 cm in transverse  dimension by 2.3 cm in AP dimension. Additional areas of restricted  diffusion are seen within the more superior aspect of the shellie. There  is also an area of restricted diffusion in the midbrain which appears  new since the previous scan. The area of restricted diffusion in the  left thalamus is better defined than on the previous study. It appears  to have increased slightly in size. There is a small area of  restricted diffusion in the right thalamus. Areas of restricted  diffusion are seen in the left occipital lobe. There are areas of  restricted diffusion in the cerebellum. All of these areas of  restricted diffusion are consistent with end infarcts. The ventricles  are normal in size for the patient's age..  No hemorrhage. No  significant mass effect.. The facial structures appear normal.  The  arteries at the base of the brain and the dural venous sinuses appear  patent.      Impression    IMPRESSION: Multiple areas of restricted diffusion consistent with  areas of infarction. Area of infarction in the brainstem, midbrain,  and thalamic regions are better defined and have increased in size  when compared to 8/30/2017.    STEFFANY DEUTSCH,  SHUKRI Elizabeth CNP  Pager: [phone] (7a - 6p)

## 2017-08-31 NOTE — PROGRESS NOTES
Pt is on full ventilator support, SpO2 98%. FiO2 titrated per ABG result. Stable respiratory stand point. Will assess and follow for weaning.8/30/2017  David Merino

## 2017-08-31 NOTE — PROGRESS NOTES
The family has been posing multiple questions related to end of life issues with the patient. They voiced some interest in attempting to get the patient home on hospice, but after further discussion they felt it may not be the best plan for their family. I have personally answered their questions regarding comfort care measures, hospice and logistics related to compassionate extubation. We will continue on the same plan to monitor neuro status for 24 hours after sedation is off. We will hold a conference tomorrow morning with neuro, ICU and the family.

## 2017-08-31 NOTE — PLAN OF CARE
Problem: Individualization  Goal: Patient Preferences  Outcome: Declining  Pt unresponsive. Posturing. Pupils unequal and fixed. Lungs coarse. Thick clear oral secretions, thin pale secretions through ET tube. Sinus with HR 50-90. PRN hydralazine given x1. Propofol for sedation. Urine output adequate. Family at bedside and updated. Monitor.

## 2017-08-31 NOTE — PLAN OF CARE
Problem: Goal Outcome Summary  Goal: Goal Outcome Summary  PT: Received eval orders.  Per conversation with RN, pt not appropriate for therapy evals at this time.  Will complete orders, please re-consult if status changes.

## 2017-08-31 NOTE — PROGRESS NOTES
"[facility] INTERVENTIONAL NEURORADIOLOGY    SUBJECTIVE: Intubated     OBJECTIVE:   Intake/Output Summary (Last 24 hours) at 08/31/17 1606  Last data filed at 08/31/17 1400   Gross per 24 hour   Intake          2737.22 ml   Output             2750 ml   Net           -12.78 ml       Imaging:   ENDOVASCULAR MECHANICAL THROMBECTOMY OF A BASILAR ARTERY  THROMBOEMBOLIC OCCLUSION USING THE PENUMBRA REPERFUSION DEVICE AND THE  SOLITAIRE STENT RETRIEVER DEVICE  8/30/2017  CONCLUSION:      TICI0-1 basilar tip occlusion with faint filling of the left PCA. No  filling of the right PCA or left SCA.     Despite multiple attempts with various reperfusion catheters and a  Solitaire stent retriever, the basilar artery remained occluded.     Results were discussed with the patient's family and neurology team  immediately following the procedure.     Labs:   Lab Results   Component Value Date    HGB 11.4 08/31/2017     Lab Results   Component Value Date     08/31/2017     Recent Labs   Lab Test  08/31/17   0435  08/30/17   0812   NA  143  139   CR  0.67  0.80         EXAM:   /81  Pulse 92  Temp 99.9  F (37.7  C) (Bladder)  Resp 16  Ht 1.626 m (5' 4\")  Wt 84.3 kg (185 lb 13.6 oz)  SpO2 100%  BMI 31.9 kg/m2  General: no distress and intubated  Skin: right groin site intact, pedal pulse 2+       ASSESSMENT:    Basilar artery stroke  S/p unsuccessful mechanical thrombectomy with no recanalization   Groin site intact     PLAN:   We will sign off  Please call our office with any questions [phone]      Lashay Guevara, LU    "

## 2017-08-31 NOTE — PLAN OF CARE
Meets criteria for LDCT    Assistance with smoking cessation was offered, including:  []  Medications  [x]  Counseling  []  Printed Information on Smoking Cessation  [x]  Referral to a Smoking Cessation Program    Patient was counseled regarding smoking for >10 minutes.       Problem: Stroke (Ischemic) (Adult)  Goal: Signs and Symptoms of Listed Potential Problems Will be Absent or Manageable (Stroke)  Signs and symptoms of listed potential problems will be absent or manageable by discharge/transition of care (reference Stroke (Ischemic) (Adult) CPG).   Outcome: No Change  Neurologically unchanged, pupils remain unequal in size and fixed, intermittent posturing noted.  MRI completed this AM.  BP and HR stable, afebrile.  Good UOP from awan.  Family present at bedside through the night, supportive of cares.  Will continue to monitor closely.

## 2017-08-31 NOTE — PLAN OF CARE
Problem: Goal Outcome Summary  Goal: Goal Outcome Summary  OT: Orders rec'd. Per discussion with nurse, patient not appropriate for therapy evals at this time. Please reconsult if status changes.

## 2017-08-31 NOTE — PROGRESS NOTES
[facility] ICU RESPIRATORY NOTE  Date of Admission: 8/30/17  Date of Intubation (most recent): 8/30/17  Reason for Mechanical Ventilation: CVA/Airway protection  Number of Days on Mechanical Ventilation: 2  Met Criteria for Pressure Support Trial: No  Reason for Stopping Pressure Support Trial:  Reason for No Pressure Support Trial: Per MD  Significant Events Today: None   ABG Results: No ABG done today  Plan: Pt remains on full ventilator support.     Suhail Kan RT.

## 2017-09-01 NOTE — PLAN OF CARE
Problem: Goal Outcome Summary  Goal: Goal Outcome Summary  Outcome: Declining   pt on comfort cares. pt unresponsive. RR irregular/snoring. Pt appears to be comfortable. NPO. Angel. Repositioning for comfort. Family at bedside. Will continue to monitor and support.

## 2017-09-01 NOTE — PROGRESS NOTES
[facility]    Critical Care Service  Progress Note    Date of Service (when I saw the patient): 09/01/2017     Assessment & Plan     Fouzia Beltrán is a 61 year old female who was admitted on 8/30/2017.       Main Plans for Today        Family care conference    Terminal extubation    Hospice consult       Neuro  1. Basilar artery occlusion, multiple new brain stem strokes  2. Failed recannulization in IR, complicated procedure by pt vomiting and sudden decreased mentation  3. MRI 8/31 -  Grim prognosis per neuro - Increased areas of restricted diffusion/infarction in brainstem, midbrain, and thalamic regions.   4. Sedation   Plan:  1. Neuro critical care following  2. Heparin gtt continued  3. Sedation stopped on 8/31 AM      CV  1. Hx of HTN - on lisinopril at home  2. Keep BP<180   3. Troponin mildly elevated  Plan:  -- Labetalol and hydralazine IV PRN       Resp:  1. Post IR procedure ventilator management  2. Acute respiratory failure - likely secondary to basilar artery occlusion and probable aspiration during IR procedure  3. Troponin likely a stress/demand issue - is currently on heparin. Will continue to trend  Plan:  -- Remain vented until neurologically stable for extubation  -- Current settings are: CMV 45%  rate 16 PEEP 5  -- Will terminally extubate 9/1 when family is ready      GI/Nutrition  1. No prior hx  2. Emesis episode in IR  Plan:  -- NPO, OG placed to LIS  -- GI prophylaxis - famotidine BID  -- PRN zofran      Renal  1. No prior hx.  Baseline creatnine appears to be 0.76  2. Sodium goal 140-155 per neuro  Plan:  -- Leave awan catheter in      ID  1. Probable aspiration during IR procedure  Plan:   --D/C Rocephin 1g IV daily when transitioned to comfort cares      Endocrine  1. Monitor for stress Hyperglycemia  Plan:  --  Stop checking BG when transitioned to Comfort care    Heme:  1. No current issues      MSK  1. No issues        Skin  1. No issues       General  cares:  DVT Prophylaxis: heparin gtt  GI Prophylaxis: H2 Blocker  Restraints: Restraints for medical healing needed: NO  Family update by me today: Yes   Current lines are required for patient management  Access:    Marily Lanier    Time Spent on this Encounter   Billing:  I spent 30 minutes bedside and on the inpatient unit today managing the critical care of Fouzia Beltrán in relation to the issues listed in this note.    Interval History   Given the lack of improvement neurologically, a family care conference was held this AM with family, ICU NP, and Dr. Lo. The family wishes to terminally extubate and transition towards hospice care this AM. We will assist in this transition when family is ready.    Physical Exam   Temp: 99.7  F (37.6  C) Temp src: Bladder Temp  Min: 98.4  F (36.9  C)  Max: 100.2  F (37.9  C) BP: 125/62   Heart Rate: 78 Resp: 18 SpO2: 100 % O2 Device: Mechanical Ventilator    Vitals:    08/30/17 0807 08/31/17 0400 09/01/17 0400   Weight: 79.4 kg (175 lb) 84.3 kg (185 lb 13.6 oz) 85.5 kg (188 lb 7.9 oz)     I/O last 3 completed shifts:  In: 2880.28 [I.V.:2760.28; NG/GT:120]  Out: 2050 [Urine:1150; Emesis/NG output:900]    GEN: lying in bed, eyes closed, hob at 45  EYES: Pupils equal 3-4mm, fixed.  Anicteric sclera.   HEENT:  Normocephalic, atraumatic, trachea midline, ETT secure  CV: RRR, no gallops, rubs, or murmurs  PULM/CHEST: coarse breath sounds bilaterally without rhonchi, crackles or wheeze, symmetric chest rise  GI: normal bowel sounds, soft, non-tender, no rebound tenderness or guarding, no masses  : awan catheter in place, urine yellow and clear  EXTREMITIES: No peripheral edema, moving all extremities, peripheral pulses intact  NEURO: Unresponsive, reflexive movements to painful stimulation, no purposeful movement. Decerebrate posturing to painful stimuli on UE.   SKIN: No rashes, sores or ulcerations  PSYCH:  RICARDA - sedated   Imaging personally reviewed:  No new  imaging.    Medications     NaCl 100 mL/hr at 09/01/17 0750     propofol (DIPRIVAN) infusion Stopped (08/31/17 0740)     HEParin 900 Units/hr (09/01/17 0750)     - MEDICATION INSTRUCTIONS -       - MEDICATION INSTRUCTIONS -         co-enzyme Q-10  100 mg Oral Daily     cholecalciferol  2,000 Units Oral Daily     lisinopril  2.5 mg Oral Daily     simvastatin  20 mg Oral At Bedtime     aspirin EC  81 mg Oral Daily     chlorhexidine  15 mL Mouth/Throat Q12H     cefTRIAXone  1 g Intravenous Q24H     famotidine  20 mg Intravenous Q12H       Data     Recent Labs  Lab 08/31/17  0435 08/30/17  2350 08/30/17  1614 08/30/17  0812   WBC 11.3*  --  17.0* 11.8*   HGB 11.4*  --  13.5 15.3     --  100 101*     --  229 320   INR  --   --   --  0.96     --   --  139   POTASSIUM 3.8  --   --  4.1   CHLORIDE 111*  --   --  107   CO2 24  --   --  27   BUN 7  --   --  12   CR 0.67  --   --  0.80   ANIONGAP 8  --   --  5   ELSA 8.1*  --   --  9.4   GLC 95  --   --  115*   TROPI 0.072* 0.037 <0.015  --      No results found for this or any previous visit (from the past 24 hour(s)).        SHUKRI Martinez CNP  Pager: [phone] (7a - 6p)

## 2017-09-01 NOTE — PROGRESS NOTES
[facility] ICU RESPIRATORY NOTE  Date of Admission: 8/30/17  Date of Intubation (most recent): 8/30/17  Reason for Mechanical Ventilation: CVA/Airway protection  Number of Days on Mechanical Ventilation: 3  Met Criteria for Pressure Support Trial: No  Reason for Stopping Pressure Support Trial:  Reason for No Pressure Support Trial: Per MD  Significant Events Today: None   ABG Results: No ABG done today  Ventilation Mode: CMV/AC  FiO2 (%): 45 %  Rate Set (breaths/minute): 16 breaths/min  Tidal Volume Set (mL): 500 mL  PEEP (cm H2O): 5 cmH2O  Oxygen Concentration (%): 45 %  Resp: 18      Plan: Pt remains on full ventilator support.     Daniel Chaudhari RT

## 2017-09-01 NOTE — PLAN OF CARE
Problem: Goal Outcome Summary  Goal: Goal Outcome Summary  Outcome: No Change  Pt Neuro status remains unchanged during shift (see Neuro charting). Plan of care discussed with family who are supportive and at bedside. Will continue to support patient and family as needed.

## 2017-09-01 NOTE — PROGRESS NOTES
Care Transition Initial Assessment - CHUY  Reason For Consult: end of life/hospice  Met with: chart review/discussed with CC/met with family   Active Problems:    CVA (cerebral vascular accident) (H)    HTN (hypertension)    Mixed hyperlipidemia         DATA  Lives With: spouse  Living Arrangements: house  Description of Support System: Supportive, Involved  Who is your support system?: Children  Support Assessment: Adequate family and caregiver support.   Identified issues/concerns regarding health management: patient will need hospice at d/c  Quality Of Family Relationships: supportive, involved    ASSESSMENT  Cognitive Status: unresponsive per RN  Concerns to be addressed:Patient is a 61 year old female who was admitted to the hospital for CVA. Prior to hospitalization patient was living at home with spouse. Patient's family would like patient to d/c on hospice as patient's condition has rapidly declined and patient is now unresponsive. SW was updated by Kaur MOLINA, who informed CHUY that patient would like a hospice consult with  Hospice. CHUY called Esther from  hospice and she stated no consult are available until Sunday. Esther confirmed a consult for L[facility]pez 9/3 @ 930. CHUY will update family. Family was okay with this consult time.     PLAN  Financial costs for the patient includes   Patient given options and choices for discharge with hospice  Patient/family is agreeable to the plan?  YES  Patient Goals and Preferences:  hospice  Patient anticipates discharging to:  [facility]   *[phone]

## 2017-09-01 NOTE — PROGRESS NOTES
Approached by pt son and daughter-in-law. Wondering about arrangements for hospice and if they need to make them. Explained to family that we would move patient to station 88 early this afternoon. Stated to family if they are going to take patient home (which had been discussed earlier in care conference) that hospital/ would make arrangements. Son and daughter-in-law state that taking patient home is no longer the plan and they would like to keep patient here.

## 2017-09-01 NOTE — PROGRESS NOTES
Patient extubated this a.m. At 0910. Morphine and rubinol given prior to extubation. Will continue to round on patient and family.

## 2017-09-01 NOTE — CONSULTS
[facility]    Hospitalist Consultation    Date of Admission:  8/30/2017  Date of Consult (When I saw the patient): 09/01/17    Assessment & Plan   Fouzia Beltrán is a 61 year old woman who was admitted on 8/30/2017 for acute ischemic vertebroarterial basilar stroke causing acute hypoxemic respiratory failure. I was asked to see the patient for medical co-management after she was extubated to comfort care this morning.    1) Comfort Care Only: Ms. Beltrán initially had admission for right pontine stroke 8/15; she then re-presented for sudden onset aphasia and weakness. She was not a TPA candidate due to recent previous stroke; IR was consulted and she underwent attempt at recannulization with thrombectomy which was unfortunately unsuccessful. She then developed sudden vomiting and respiratory depression and was intubated and sedated for airway management as her stroke symptoms continued to progress. Neurology Critical Care was consulted and MRI revealed new pontine, brainstem, and thalamic strokes.  She remained comatose and eventually the decision was made in conjunction with family care discussions to extubate her today and focus on comfort care only.    -- Transfer to private room    -- Morphine liberally as needed for comfort; add Ativan if she develops anxiety or Haldol if agitation develops    -- Hospice social work consult placed; she may die today or tomorrow, otherwise we will have further care plan with discussions with her family tomorrow    2) Myonecrosis : Likely due to demand ischemia due to stroke.     DVT Prophylaxis: No  Code Status: DNR/DNI    Disposition: Expected death soon    Roddy Phipps MD    Reason for Consult   Reason for consult: I was asked by Marily Lanier ICU NP to evaluate this patient for transfer of care.    Primary Care Physician **Missy Orta    Chief Complaint   Aphasia    History of Present Illness   Fouzia Beltrán is a 61 year old woman who presents with  sudden onset aphasia on the day of admission, associated with diffuse weakness and unrelieved with attempted recannulization of her basilar artery. Her symptoms have progressed since onset. Her family at the bedside say she feels better with Morphine. She is currently resting peacefully in the bed without arousal.    Past Medical History    I have reviewed this patient's medical history and updated it with pertinent information if needed.     1) Prior stroke  2) Hypertension  3) Dyslipidemia    Past Surgical History   I have reviewed this patient's surgical history and updated it with pertinent information if needed.    Prior to Admission Medications   Prior to Admission Medications   Prescriptions Last Dose Informant Patient Reported? Taking?   aspirin EC 81 MG EC tablet 8/29/2017  No Yes   Sig: Take 1 tablet (81 mg) by mouth daily   lisinopril (PRINIVIL/ZESTRIL) 2.5 MG tablet 8/29/2017  No Yes   Sig: Take 1 tablet (2.5 mg) by mouth daily   multivitamin, therapeutic with minerals (THERA-VIT-M) TABS tablet 8/29/2017 Self Yes Yes   Sig: Take 1 tablet by mouth daily   simvastatin (ZOCOR) 20 MG tablet 8/29/2017  No Yes   Sig: Take 1 tablet (20 mg) by mouth At Bedtime      Facility-Administered Medications: None     Allergies   No Known Allergies    Social History   I have reviewed this patient's social history and updated it with pertinent information if needed. Fouzia Beltrán  reports that she has been smoking.  She has never used smokeless tobacco. She reports that she drinks alcohol. She reports that she does not use illicit drugs.    Family History   I have reviewed this patient's family history and updated it with pertinent information if needed.    Review of Systems   The 10 point Review of Systems is negative other than noted in the HPI or here.     Physical Exam   Temp: 100.6  F (38.1  C) Temp src: Bladder BP: 125/62   Heart Rate: 83 Resp: 18 SpO2: 100 % O2 Device: Mechanical Ventilator    Vital Signs with  Ranges  Temp:  [99.5  F (37.5  C)-100.6  F (38.1  C)] 100.6  F (38.1  C)  Heart Rate:  [] 83  Resp:  [16-23] 18  BP: (110-184)/(51-90) 125/62  FiO2 (%):  [45 %] 45 %  SpO2:  [99 %-100 %] 100 %  188 lbs 7.89 oz    Constitutional: Obtunded; no apparent distress  HEENT: normocephalic, moist mucous membranes  Respiratory: lungs clear to auscultation bilaterally  Cardiovascular: regular S1 S2 no murmurs rubs or gallops  GI: abdomen soft non tender non distended bowel sounds positive  Lymph/Hematologic: no pallor, no cervical lymphadenopathy  Skin: no rash, good turgor  Musculoskeletal: no clubbing, cyanosis or edema    Data   -Data reviewed today: All pertinent laboratory and imaging results from this encounter were reviewed. I personally reviewed no images or EKG's today.    Recent Labs  Lab 08/31/17  0435 08/30/17  2350 08/30/17  1614 08/30/17  0812   WBC 11.3*  --  17.0* 11.8*   HGB 11.4*  --  13.5 15.3     --  100 101*     --  229 320   INR  --   --   --  0.96     --   --  139   POTASSIUM 3.8  --   --  4.1   CHLORIDE 111*  --   --  107   CO2 24  --   --  27   BUN 7  --   --  12   CR 0.67  --   --  0.80   ANIONGAP 8  --   --  5   ELSA 8.1*  --   --  9.4   GLC 95  --   --  115*   TROPI 0.072* 0.037 <0.015  --        No results found for this or any previous visit (from the past 24 hour(s)).

## 2017-09-01 NOTE — PLAN OF CARE
Problem: Goal Outcome Summary  Goal: Goal Outcome Summary  Outcome: No Change  Patient remains intubated. No neuro changes this shift. Pupils remain fixed and dilated. Patient decerebrate posture with stimulation, not following commands or opening eyes. Cough intact. Patient became hypertensive, PRN hydralazine and dilaudid given per family request. Posturing frequency decreased after dilaudid. One episode of bradycardic with rate down to 42, then episodes of HR up to 130's with stimulation. Family bedside all night. All questions answered as able.

## 2017-09-01 NOTE — PROGRESS NOTES
Hospice: Hospice consult set up for Sunday 9:30 via Karlie Esteban  if the pt doesn't pass in the hospital. Thank you Esther Deshpande RN, BSN   Hospice Admission nurse  [phone]

## 2017-09-01 NOTE — PROGRESS NOTES
Requested to meet with familyby pt's nurse as they had some questions about hospice. Pt is currently comfort care. Pt is expected to pass in the next 1-2 days. Family would like to meet with hospice however, they do not have any open consults until L[facility]pez am. Briefly discussed hospice including in home hospice, LTC, and facilities like [facility]. Family willing to talk on Sunday with hospice, but they are thinking she will pass before then. Will continue to watch for further care coordinator needs

## 2017-09-02 NOTE — PROGRESS NOTES
[facility]    Hospitalist Progress Note    Date of Service (when I saw the patient): 09/02/2017    Assessment & Plan   Fouzia Beltrán is a 61 year old woman who was admitted on 8/30/2017 for acute ischemic vertebroarterial basilar stroke causing acute hypoxemic respiratory failure. I was asked to see the patient for medical co-management after she was extubated to comfort care this morning.     1) Comfort Care Only: Ms. Beltrán initially had admission for right pontine stroke 8/15; she then re-presented for sudden onset aphasia and weakness. She was not a TPA candidate due to recent previous stroke; IR was consulted and she underwent attempt at recannulization with thrombectomy which was unfortunately unsuccessful. She then developed sudden vomiting and respiratory depression and was intubated and sedated for airway management as her stroke symptoms continued to progress. Neurology Critical Care was consulted and MRI revealed new pontine, brainstem, and thalamic strokes.  She remained comatose and eventually the decision was made in conjunction with family care discussions to extubate her on 9/1/2017 and focus on comfort care only.    -- Transfer to private room    -- Morphine liberally as needed for comfort; add Ativan if she develops anxiety or Haldol if agitation develops    -- Hospice social work consult placed; she may die today or tomorrow, otherwise we will have further care plan with discussions with her family tomorrow morning     2) Myonecrosis : Likely due to demand ischemia due to stroke.     DVT Prophylaxis: No  Code Status: DNR/DNI    Disposition: Expected discharge tomorrow possibly to hospice facility    Roddy Phipps MD    Interval History   Sleeping, not arousable, breathing comfortably. Multiple family members at the bedside were updated.    -Data reviewed today: I reviewed all new labs and imaging results over the last 24 hours. I personally reviewed no images or EKG's  today.    Physical Exam   Temp: 101.5  F (38.6  C)       Heart Rate: 73 Resp: 16        Vitals:    08/30/17 0807 08/31/17 0400 09/01/17 0400   Weight: 79.4 kg (175 lb) 84.3 kg (185 lb 13.6 oz) 85.5 kg (188 lb 7.9 oz)     Vital Signs with Ranges  Temp:  [101.5  F (38.6  C)] 101.5  F (38.6  C)  Heart Rate:  [73] 73  Resp:  [16-22] 16  I/O last 3 completed shifts:  In: -   Out: 350 [Urine:350]    Constitutional: Remains obtunded today; no apparent distress  HEENT: normocephalic, moist mucous membranes  Respiratory: lungs clear to auscultation bilaterally  Cardiovascular: regular S1 S2 no murmurs rubs or gallops  GI: abdomen soft non tender non distended bowel sounds positive  Lymph/Hematologic: no pallor, no cervical lymphadenopathy  Skin: no rash, good turgor  Musculoskeletal: no clubbing, cyanosis or edema    Medications        sodium chloride (PF)  3 mL Intracatheter Q8H     sodium chloride (PF)  3 mL Intracatheter Q8H       Data     Recent Labs  Lab 08/31/17  0435 08/30/17  2350 08/30/17  1614 08/30/17  0812   WBC 11.3*  --  17.0* 11.8*   HGB 11.4*  --  13.5 15.3     --  100 101*     --  229 320   INR  --   --   --  0.96     --   --  139   POTASSIUM 3.8  --   --  4.1   CHLORIDE 111*  --   --  107   CO2 24  --   --  27   BUN 7  --   --  12   CR 0.67  --   --  0.80   ANIONGAP 8  --   --  5   ELSA 8.1*  --   --  9.4   GLC 95  --   --  115*   TROPI 0.072* 0.037 <0.015  --        No results found for this or any previous visit (from the past 24 hour(s)).

## 2017-09-02 NOTE — PLAN OF CARE
Problem: Goal Outcome Summary  Goal: Goal Outcome Summary  Outcome: No Change  Somnolent. RR irregular. Periods of tachypnea. Morphine given x2. Robinul x1 for secretions. Turning as family allows; about q3-4h. Angel patent. Oral cares done. Plan for hospice meeting tomorrow around 0930. Family at bedside. Continue to monitor.

## 2017-09-02 NOTE — PLAN OF CARE
Problem: Goal Outcome Summary  Goal: Goal Outcome Summary  Outcome: No Change  Full VS & assessment deferred, comfort cares. Pt unresponsive. RR low 20s, snoring; periods of apnea. Robinul given/suctioned PRN for secretions. Repositioned PRN at families request, daughter at bedside. Angel patent, UOP low. PIV SL x 2. Skin warm. Pt appeared to rest comfortably throughout the night. Will continue to monitor. Plan: hospice consult Sunday 0930.

## 2017-09-02 NOTE — PLAN OF CARE
Problem: Goal Outcome Summary  Goal: Goal Outcome Summary  Outcome: No Change   pt on comfort cares. pt unresponsive. RR irregular/snoring occasionally copious oral secretions/cough requiring suctioning and robinol. Pt appears to be comfortable. NPO. Angel. Repositioning for comfort. Family at bedside. Low UOP.  Will continue to monitor and support.  Hospice consult pending.

## 2017-09-03 NOTE — PLAN OF CARE
Problem: Goal Outcome Summary  Goal: Goal Outcome Summary  Outcome: Adequate for Discharge Date Met:  09/03/17    After visit summary, meds, and hospice plans reviewed with the patient's daughter and . Patient left at 1630 accompanied by 9+.

## 2017-09-03 NOTE — PLAN OF CARE
Problem: Goal Outcome Summary  Goal: Goal Outcome Summary  Outcome: No Change  Unresponsive. Breathing irregular but appears comfortable. PRN morphine and robinul given. Repo per family request. Angel patent, low output. Plan for hospice meeting this morning 0997

## 2017-09-03 NOTE — PLAN OF CARE
Problem: Goal Outcome Summary  Goal: Goal Outcome Summary  Outcome: No Change  Comfort cares. RR 20's with periods of apnea alternating with tachypnea/labored breathing. Morphine given x2 for dsypnea. Robinul given x2. Turn/repo as family allows; about q3h. Oral cares done x2. Angel patent. Plan for d/c home with hospice via stretcher transport at 1600. Meds filled here. Morphine to be delivered to patient's home as it's being filled by outside pharmacy. Continue to monitor.

## 2017-09-03 NOTE — PLAN OF CARE
Problem: Goal Outcome Summary  Goal: Goal Outcome Summary  Outcome: No Change  Somnolent. RR irregular. Periods of tachypnea/labored breathing. Morphine given x1 and Robinul x1 for secretions per family request. Turning as family allows; about q3-4h. Angel patent. Shampoo done per nursing assistant.  Plan for hospice meeting tomorrow around 0930. Family at bedside. Continue to monitor.

## 2017-09-03 NOTE — CONSULTS
Hospice Inpatient Consult    CHUY met with pt and numerous family members (four children, pt's spouse Juancho Miller', pt's mother, sister, brother in law) to discuss hospice benefit and philosophy of care.    Pt was admitted to hospital on 8/30 (had also been admitted 8/15 and dx with CVA) and was found to have a massive stroke.  She was intubated and in ICU, it was discovered that the stroke was significant and family elected to transition to comfort cares on 09/01.  Medical team had wondered if she would live through the weekend, but she did.  CHUY met with family on this date 09/03/17 at 0930 and family is unanimous in decision to take patient home.    Discussed other options as well, including placement in a hospice residential facility or NH.    Spouse Todd signed the paperwork for hospice admission.   CHUY and martin Lynn provided family with list of private pay agencies, however family has elected to hire a family friend for cares around the clock, as well as support from many family members.  Family seems supportive and pragmatic regarding trajectory of EOL, confirmed DNR/DNI status, and agree with continuing comfort cares.    Encouraged family to utilize the hospice phone number if they have any questions.      Equipment to be ordered:  Hospital bed, OTB Table, Yaunker Machine to be delivered at 3PM today.  Transport set for 4pm.  POLST completed by Dr. Kenney  Collaborated with martin Lynn and JOSEY Chen and MD Kenney    Hospice team will complete admission 09/04/17 at 1PM.      SIXTO Salcedo, [facility]  Hospice Intake    Office Main Number: [phone]

## 2017-09-03 NOTE — PROGRESS NOTES
Social Work Progress Note  Pt chart reviewed. Pt discussed in interdisciplinary rounds.     Intervention: Per [facility] hospice liaison Tonya, pt and family would like her to d/c home with hospice and family support. Writer provided family with private duty homecare resources. Writer arranged for a 1600 HE stretcher transport with [facility]. PCS form completed and faxed. Tonya ordered pt's equipments, which will be delivered prior to pt's arrival at home.     Team members notified: Bedside RN, CC &HUC    Plan:  Anticipated Discharge Placement: Home with Hospice.   Barriers: None identified at this time.   Follow-up plan:  No further plans to follow. Sw available should a need arise.     SIXTO Mathews, [facility]  [phone]  9/3/2017 11:38 AM

## 2017-09-03 NOTE — DISCHARGE SUMMARY
[facility]    Discharge Summary  Hospitalist    Date of Admission:  8/30/2017  Date of Discharge:  9/3/2017  Discharging Provider: Roddy Phipps MD  Date of Service (when I saw the patient): 09/03/17    Discharge Diagnoses   Acute terminal brainstem stroke; home hospice enrollment    History of Present Illness   Fouzia Beltrán is a 61 year old woman who was admitted on 8/30/2017 for acute ischemic vertebroarterial basilar stroke causing acute hypoxemic respiratory failure. Please see the H and P and ICU notes for complete details of the initial presentation.      Hospital Course   Fouzia Beltrán was admitted on 8/30/2017.  The following problems were addressed during her hospitalization:    Active Problems:  1) Comfort Care Only: Ms. Beltrán initially had admission for right pontine stroke 8/15; she then re-presented for sudden onset aphasia and weakness. She was not a TPA candidate due to recent previous stroke; IR was consulted and she underwent attempt at recannulization with thrombectomy which was unfortunately unsuccessful. She then developed sudden vomiting and respiratory depression and was intubated and sedated for airway management as her stroke symptoms continued to progress. Neurology Critical Care was consulted and MRI revealed new pontine, brainstem, and thalamic strokes.  She remained comatose and eventually the decision was made in conjunction with family care discussions to extubate her on 9/1/2017 and focus on comfort care only. She was transferred to a private room. Morphine was given for relief of symptoms with good effect. Hospice social work consult was placed, and she will be discharged home with hospice enrollment today.      2) Myonecrosis : Likely due to demand ischemia due to stroke.    Roddy Phipps MD    Code Status   DNR / DNI       Primary Care Physician   Missy Orta    Constitutional: Obtunded; no apparent distress  HEENT: normocephalic moist mucous  membranes  Respiratory: lungs remain clear to auscultation bilaterally  Cardiovascular: Irregular S1 S2 no murmurs rubs or gallops  GI: abdomen soft non tender non distended bowel sounds positive  Lymph/Hematologic: pale, no cervical lymphadenopathy  Skin: no rash, good turgor  Musculoskeletal: no clubbing, cyanosis or edema    Discharge Disposition   Discharged to home  Condition at discharge: Terminal    Consultations This Hospital Stay   PHARMACY TO DOSE HEPARIN  NEUROLOGY IP CONSULT  PHYSICAL THERAPY ADULT IP CONSULT  OCCUPATIONAL THERAPY ADULT IP CONSULT  SPEECH LANGUAGE PATH ADULT IP CONSULT  SWALLOW EVAL SPEECH PATH AT BEDSIDE IP CONSULT  SMOKING CESSATION PROGRAM IP CONSULT  RESPIRATORY CARE IP CONSULT  SMOKING CESSATION PROGRAM IP CONSULT  SOCIAL WORK IP CONSULT  SOCIAL WORK IP CONSULT  HOSPITALIST IP CONSULT  PHARMACY TO DOSE HEPARIN    Time Spent on this Encounter   I, Roddy Phipps, personally saw the patient today and spent greater than 30 minutes discharging this patient.    Discharge Orders     Home care nursing referral     HOSPICE REFERRAL     Reason for your hospital stay   For evaluation of stroke and enrollment in hospice care.     Follow-up and recommended labs and tests    Follow up with [facility] tomorrow.     Activity   Your activity upon discharge: Bedrest     MD face to face encounter   Documentation of Face to Face and Certification for Home Health Services    I certify that patient: Fouzia Beltrán is under my care and that I, or a nurse practitioner or physician's assistant working with me, had a face-to-face encounter that meets the physician face-to-face encounter requirements with this patient on: 9/3/2017.    This encounter with the patient was in whole, or in part, for the following medical condition, which is the primary reason for home health care: Stroke.    I certify that, based on my findings, the following services are medically necessary home health services:  Nursing.    My clinical findings support the need for the above services because: Nurse is needed: home hospice for end of life care    Further, I certify that my clinical findings support that this patient is homebound (i.e. absences from home require considerable and taxing effort and are for medical reasons or [employer] services or infrequently or of short duration when for other reasons) because: Patient is bedbound due to: stroke..    Based on the above findings. I certify that this patient is confined to the home and needs intermittent skilled nursing care, physical therapy and/or speech therapy.  The patient is under my care, and I have initiated the establishment of the plan of care.  This patient will be followed by a physician who will periodically review the plan of care.  Physician/Provider to provide follow up care: Missy Orta    Attending hospital physician (the Medicare certified [place] provider): Roddy Phipps  Physician Signature: See electronic signature associated with these discharge orders.  Date: 9/3/2017     DNR/DNI     Diet   Follow this diet upon discharge: Eat and drink for comfort or pleasure       Discharge Medications   Current Discharge Medication List      START taking these medications    Details   MEDICATION INSTRUCTION If care facility cannot accept or use ranges, facility is instructed to use lower end of dosing range    Associated Diagnoses: Cerebrovascular accident (CVA), unspecified mechanism (H)      morphine 2.5 MG solu-tab Place 1 tablet (2.5 mg) under the tongue every 2 hours as needed for moderate to severe pain (and/or shortness of breath.)  Qty: 30 tablet, Refills: 0    Associated Diagnoses: Cerebrovascular accident (CVA), unspecified mechanism (H)      atropine 1 % ophthalmic solution Take 2-4 drops by mouth, place under tongue or place inside cheek every 2 hours as needed for other (terminal respiratory secretions) Not for ophthalmic use.  Qty: 5 mL, Refills: 1     Associated Diagnoses: Cerebrovascular accident (CVA), unspecified mechanism (H)      LORazepam (ATIVAN) 2 MG/ML (HIGH CONC) solution Take 0.25-0.5 mLs (0.5-1 mg) by mouth, place under tongue or insert rectally every 4 hours as needed for anxiety (restlessness)  Qty: 30 mL, Refills: 1    Associated Diagnoses: Cerebrovascular accident (CVA), unspecified mechanism (H)      haloperidol (HALDOL) 2 MG/ML (HIGH CONC) solution Take 0.5-1 mLs (1-2 mg) by mouth, place under tongue or insert rectally every 6 hours as needed for agitation (nausea)  Qty: 30 mL, Refills: 1    Associated Diagnoses: Cerebrovascular accident (CVA), unspecified mechanism (H)      bisacodyl (DULCOLAX) 10 MG Suppository Unwrap and insert 1 suppository rectally twice daily as needed for constipation.  Qty: 12 suppository, Refills: 1    Associated Diagnoses: Cerebrovascular accident (CVA), unspecified mechanism (H)      acetaminophen (TYLENOL) 32 mg/mL solution Take 20.3 mLs (650 mg) by mouth every 4 hours as needed for fever or mild pain  Qty: 237 mL, Refills: 1    Associated Diagnoses: Cerebrovascular accident (CVA), unspecified mechanism (H)         STOP taking these medications       aspirin EC 81 MG EC tablet Comments:   Reason for Stopping:         simvastatin (ZOCOR) 20 MG tablet Comments:   Reason for Stopping:         lisinopril (PRINIVIL/ZESTRIL) 2.5 MG tablet Comments:   Reason for Stopping:         multivitamin, therapeutic with minerals (THERA-VIT-M) TABS tablet Comments:   Reason for Stopping:             Allergies   No Known Allergies  Data   Most Recent 3 CBC's:  Recent Labs   Lab Test  08/31/17   0435  08/30/17   1614  08/30/17   0812   WBC  11.3*  17.0*  11.8*   HGB  11.4*  13.5  15.3   MCV  100  100  101*   PLT  271  229  320      Most Recent 3 BMP's:  Recent Labs   Lab Test  08/31/17   0435  08/30/17   0812  08/16/17   0729   NA  143  139  142   POTASSIUM  3.8  4.1  4.0   CHLORIDE  111*  107  109   CO2  24  27  23   BUN  7  12  9    CR  0.67  0.80  0.78   ANIONGAP  8  5  10   ELSA  8.1*  9.4  8.4*   GLC  95  115*  106*     Most Recent 2 LFT's:  Recent Labs   Lab Test  08/16/17   0729   AST  10   ALT  20   ALKPHOS  51   BILITOTAL  0.9     Most Recent INR's and Anticoagulation Dosing History:  Anticoagulation Dose History     Recent Dosing and Labs Latest Ref Rng & Units 8/15/2017 8/30/2017    INR 0.86 - 1.14 0.92 0.96        Most Recent 3 Troponin's:  Recent Labs   Lab Test  08/31/17   0435  08/30/17   2350  08/30/17   1614   TROPI  0.072*  0.037  <0.015     Most Recent Cholesterol Panel:  Recent Labs   Lab Test  08/15/17   1705   CHOL  242*   LDL  127*   HDL  78   TRIG  186*     Most Recent 6 Bacteria Isolates From Any Culture (See EPIC Reports for Culture Details):No lab results found.  Most Recent TSH, T4 and A1c Labs:  Recent Labs   Lab Test  08/15/17   1705   TSH  1.69   A1C  5.2     Results for orders placed or performed during the hospital encounter of 08/30/17   CT Head w Contrast    Narrative    CT BRAIN PERFUSION 8/30/2017 8:56 AM    HISTORY: Code Stroke.    TECHNIQUE: Time sequential axial CT images of the head were acquired  during the administration of intravenous contrast 50 mL Isovue-370.  CTA images of the [place] as well as color perfusion maps of  the brain were created from this time sequential axial source data.  Radiation dose for this scan was reduced using automated exposure  control, adjustment of the mA and/or kV according to patient size, or  iterative reconstruction technique.    COMPARISON: CT angiogram today.    FINDINGS: Perfusion abnormalities are present in the central shellie and  both thalami consistent with infarction.      Impression    IMPRESSION: Brainstem and bithalamic perfusion defects.    SOBEIDA MORTENSEN MD   CT Head w/o Contrast    Narrative    CT HEAD W/O CONTRAST  8/30/2017 8:22 AM    HISTORY: Stroke symptoms with right-sided weakness and double vision.    TECHNIQUE: Scans were obtained  through the head without IV contrast.   Radiation dose for this scan was reduced using automated exposure  control, adjustment of the mA and/or kV according to patient size, or  iterative reconstruction technique.    COMPARISON: MRI and CT scan 8/15/2017.    FINDINGS: 2 cm old right occipital lobe infarct and central/left  paramedian old pontine infarct as noted on MR of 8/15/2017 and CT of  8/15/2017.  No hemorrhage, mass lesion, or focal area of acute  infarction identified. Paranasal sinuses are normal. No bony  abnormality. Mild atrophy. CT angiogram follow-up.      Impression    IMPRESSION:   1. Atrophy and old infarcts as described.  2. Nothing acute.  3. CT angiogram to follow. 4 no interval change.    SOBEIDA MORTENSEN MD   CTA Angiogram Head Neck    Narrative    CT ANGIOGRAM OF THE HEAD AND NECK WITHOUT AND WITH CONTRAST  8/30/2017  8:30 AM     HISTORY: Right-sided weakness and double vision.    TECHNIQUE: Precontrast localizing scans were followed by CT  angiography with an injection of 70 mL nonionic contrast material IV  with scans through the head and neck.  Images were transferred to a  separate 3-D workstation where multiplanar reformations and 3-D images  were created. Estimates of carotid stenoses are made relative to the  distal internal carotid artery diameters except as noted. Radiation  dose for this scan was reduced using automated exposure control,  adjustment of the mA and/or kV according to patient size, or iterative  reconstruction technique.    COMPARISON: CT angiogram 8/15/2017.    FINDINGS:  Suboptimal bolus of contrast makes the [place]  vasculature difficult to interpret. The distal basilar artery appears  quite small and origin of the posterior cerebral arteries off the  basilar tip are not well seen. This could be site of thrombus or  high-grade stenosis. This basilar tip region is less clearly seen  today than on 8/15/2017. Part of this is due to the fact that  bolus  injection was not as effective today but there may be new clot or  stenosis in the basilar tip region. These findings were discussed with  Dr. Lo. Conventional angiogram might be of value. No evidence for  aneurysm.    No evidence for stenosis of either carotid bifurcation. Antegrade flow  in both vertebral arteries.    The origin of the great vessels off the aortic arch are intact.      Impression    IMPRESSION:  1. Distal basilar and basilar tip not well seen. Clot or severe  stenosis could be present. This is more evident today than on the  prior exam.  2. No stenosis at either carotid bifurcation.  3. Findings discussed with Dr. Lo. Conventional angiogram might be  of value for further evaluation of the [place].    SOBEIDA MORTENSEN MD   MR Brain w/o Contrast    Narrative    MR BRAIN WITHOUT CONTRAST  8/30/2017 9:06 AM    HISTORY: Stroke.    COMPARISON: CT and perfusion scans today.    TECHNIQUE: Routine noncontrast MR brain.    FINDINGS: Moderate-sized areas of restricted diffusion scattered  throughout the central shellie and lower midbrain consistent with recent  infarcts. Additionally, there is restricted diffusion in a 1 cm region  of the left thalamus and a tiny portion of the right thalamus also  consistent with recent infarcts. Thalamic infarcts were not present on  8/15/2017. There were smaller areas of brain stem infarction on the  prior study but considerably more restricted diffusion today.    There is an old infarct in the right occipital lobe 2.5 cm in size.    No evidence for hemorrhage.      Impression    IMPRESSION:  1. Recent areas of infarction in the shellie and lower midbrain. These  have increased in extent in the interval.  2. Recent areas of infarction in the thalami left more than right.  These are new since the prior study.  3. Findings discussed with Dr. Lo.    SOBEIDA MORTENSEN MD   IR Carotid Cerebral Angiogram Bilateral    Narrative    [facility]    INTERVENTIONAL NEURORADIOLOGY   PROCEDURAL NOTE    1. ENDOVASCULAR MECHANICAL THROMBECTOMY OF A BASILAR ARTERY  THROMBOEMBOLIC OCCLUSION USING THE PENUMBRA REPERFUSION DEVICE AND THE  SOLITAIRE STENT RETRIEVER DEVICE  2. CEREBRAL ANGIOGRAM: SELECTIVE CATHETERIZATION OF THE LEFT VERTEBRAL  ARTERY WITH ANGIOGRAPHIC IMAGING CENTERED OVER THE HEAD   3. CEREBRAL ANGIOGRAM: SELECTIVE CATHETERIZATION OF THE RIGHT COMMON  CAROTID ARTERY WITH ANGIOGRAPHIC IMAGING CENTERED OVER THE HEAD   4. CEREBRAL ANGIOGRAM: SELECTIVE CATHETERIZATION OF THE LEFT COMMON  CAROTID ARTERY WITH ANGIOGRAPHIC IMAGING CENTERED OVER THE HEAD     INDICATION: History of dizziness, weakness, slurred speech, and  possible basilar tip occlusion identified on CTA. Angiography to be  performed for further assessment and possible endovascular mechanical  thrombectomy.     CONSENT: Cerebral angiography and endovascular mechanical thrombectomy  were discussed in detail. The procedures' indications, major risks,  benefits, and alternatives were discussed. Risks including, but not  limited to, arterial perforation or dissection, stroke (including  reperfusion hemorrhage), and cardiac or pulmonary complications of  sedation, were discussed. Understanding was acknowledged, and informed  consent was obtained.      MODERATE SEDATION: Versed 7.5 mg. Fentanyl 350 mcg. The procedure was  performed with administration intravenous conscious sedation with  appropriate preoperative, intraoperative, and postoperative  evaluation. 235 minutes of supervised face to face conscious sedation  time was provided by a radiology nurse under my direct supervision.     MEDICATIONS:   Versed 7.5 mg IV   Fentanyl 300 mcg IV     CONTRAST: 185 cc Omnipaque 300     FLUOROSCOPIC TIME: 94.2 minutes     DOSE: Air Kerma: 6205 mGy     ESTIMATED BLOOD LOSS: 100 cc     PERFORMING PHYSICIAN(S):   KAYLEIGH Kinney M.D.    PROCEDURE: The patient was placed supine upon  the neuroangiography  table. The skin of both groins was prepped and draped in sterile  fashion. Under local anesthesia with lidocaine and sterile technique,  the right common femoral artery was percutaneously accessed with a 4  English micropuncture set. A short 6 English sheath was then placed. A 5  English diagnostic catheter was then advanced over an angled Glidewire  into the aortic arch and used to selectively and subselectively  catheterize the left vertebral artery. Digital subtraction angiograms  of the head were performed in multiple projections with this  catheterization.     PRE-TREATMENT FINDINGS:     Images of the left vertebral artery centered over the head demonstrate  TICI0-1 basilar tip occlusion with faint filling of the left PCA  distal to a focal stenosis. No filling of the right PCA or left SCA.  The distal basilar artery is narrowed and irregular.    PROCEDURE CONTINUED:     The diagnostic catheter was then exchanged for a Neuron MAX guiding  sheath over an exchange length Puente wire. The left vertebral artery  was tortuous, limiting the degree to which the Neuron MAX could be  advanced. The Penumbra ACE68 reperfusion catheter was then advanced  coaxially with a 3MAX ACE reperfusion catheter over a Fathom 16 wire  around the proximal tortuosity and up the left vertebral artery to the  basilar occlusion. The Neuron MAX was advanced further to the lower  cervical segment. The clot was noted to be very firm with the wire  banking off of rather than passing through on the first several  attempts. The ACE68 was positioned at the proximal clot face and  aspiration was performed. The system was removed and angiography  performed. The basilar occlusion progressed more proximally, now with  a lack of filling of the right superior cerebellar artery. This may be  secondary to intimal injury and/or propagated thrombus from  instrumentation.     A second pass was then performed using the same system with the  same  result.     Next, given failure of the reperfusion catheter system to open the  occlusion, we proceeded with a Solitaire stent retriever (4 x 20 mm),  spanning the left PCA to the basilar trunk. This recanalized the  basilar artery into the left posterior cerebral artery. However, the  underlying intima appeared very irregular. The vessel spontaneously  closed again after performing a short interval follow-up angiogram.    Angiography of the carotid arteries was performed bilaterally to  assess collateral flow via a 125 cm [email or web address] diagnostic catheter  through the Neuron MAX. This identified only collateral supply to the  left PCA (which had been previously cleared with the stent retriever),  but no contribution to the right PCA or basilar tip, consistent with  unchanged occlusion.    Dr. Aponte at this time arrived as an additional  for the  procedure.    Given the severe irregularity of the vessel, we next attempted to open  the right posterior cerebral or superior cerebellar arteries with a  3MAX ACE reperfusion catheter (smallest profile). However, this was  unsuccessful.     Dr. Lo was also present and we jointly discussed the risks and  benefits of potentially placing a Wingspan stent. We decided to  attempt access for Wingspan stent placement. However, once again, the  origin of the vertebral artery contributed to significant instability  of the system and the entire system prolapsed into the aortic arch  when advancing the stenting system over the exchange length wire. Wire  positioning in the right PCA/SCA was initially confirmed following  several microangiograms. The patient additionally became nauseated  during the procedure and began vomiting, for which suction was  performed and anesthesiology called for intubation.    Given failed recanalization despite numerous attempts and significant  access challenges, we decided to conclude the procedure. The system  was removed and  hemostasis achieved with manual pressure.    POST-TREATMENT FINDINGS:     Images of the left vertebral artery centered over the head demonstrate  persistent occlusion of the basilar trunk.    Images of the left common carotid artery centered over the head  demonstrate good collateral PCOM supply to the left PCA. Normal  variant infundibular origin of the PCOM.    Images of the right common carotid artery centered over the head  demonstrate no significant collateral supply to the right PCA or  basilar tip. Normal variant infundibular origin of the PCOM.    INTERVENTION DATA POINTS:   Femoral artery access: 1002 hours   Clot ID: [id number]   Aspiration start/end: 1028-31, 1044-49, 1127-28, 4121-7033, 1220-24  hours   Pretreatment TICI score: 0-1   Final TICI score: 0    CONCLUSION:     TICI0-1 basilar tip occlusion with faint filling of the left PCA. No  filling of the right PCA or left SCA.    Despite multiple attempts with various reperfusion catheters and a  Solitaire stent retriever, the basilar artery remained occluded.    Results were discussed with the patient's family and neurology team  immediately following the procedure.     Evaluation and stenosis determination based on NASCET criteria.   _____________________________________________   Anson Mercedes M.D.   Interventional Neuroradiologist   [facility]   Office: [phone]   Pager: [phone]   Cell: [phone]       ANSON MERCEDES MD   MR Brain w/o Contrast    Narrative    MRI BRAIN WITHOUT CONTRAST August 30, 2017 2:58 PM    HISTORY: Massive stroke.    TECHNIQUE: Multiplanar, multisequence MRI of the brain without  gadolinium IV contrast material.      COMPARISON: 8/30/2017 at 0848 hours.    FINDINGS: Diffusion-weighted images show significantly increased of  areas of restricted diffusion in the posterior vascular distribution  involving both thalamic nuclei, the midbrain, shellie, and right  cerebellum as well as both occipital lobes.  There is no definite  evidence for any hemorrhage. There is an old small infarct in the  right occipital lobe which is now surrounded by areas of increased  signal and restricted diffusion suggesting that there is no infarct in  this location. There is no midline shift.      Impression    IMPRESSION: Multifocal acute ischemic infarcts involving the  brainstem, thalamic nuclei, right cerebellum, and both occipital lobes  with progression since scan earlier on same day. There is no evidence  for any significant mass effect or hydrocephalus.    ANGY CARROLL MD   MR Brain w/o Contrast    Narrative    MR BRAIN W/O CONTRAST  8/31/2017 5:40 AM     HISTORY:  new stroke    TECHNIQUE: Multiplanar, multisequence MRI of the brain without  gadolinium IV contrast material.    COMPARISON: MR scan dated 8/30/2017    FINDINGS: Diffusion-weighted images reveal a large area of restricted  diffusion in the brainstem. This is better defined than on the  previous study. This area of restricted diffusion extends both to the  right and left of midline. It measures about 2.4 cm in transverse  dimension by 2.3 cm in AP dimension. Additional areas of restricted  diffusion are seen within the more superior aspect of the shellie. There  is also an area of restricted diffusion in the midbrain which appears  new since the previous scan. The area of restricted diffusion in the  left thalamus is better defined than on the previous study. It appears  to have increased slightly in size. There is a small area of  restricted diffusion in the right thalamus. Areas of restricted  diffusion are seen in the left occipital lobe. There are areas of  restricted diffusion in the cerebellum. All of these areas of  restricted diffusion are consistent with end infarcts. The ventricles  are normal in size for the patient's age..  No hemorrhage. No  significant mass effect.. The facial structures appear normal.  The  arteries at the base of the brain and the dural  venous sinuses appear  patent.      Impression    IMPRESSION: Multiple areas of restricted diffusion consistent with  areas of infarction. Area of infarction in the brainstem, midbrain,  and thalamic regions are better defined and have increased in size  when compared to 8/30/2017.    STEFFANY DEUTSCH MD   XR Chest Port 1 View    Narrative    CHEST PORTABLE ONE VIEW  8/30/2017 4:52 PM     HISTORY: Endotracheal tube positioning.    COMPARISON: None.      Impression    IMPRESSION: Heart size is normal. Endotracheal tube is at the thoracic  inlet, 5 cm proximal to the reji. Esophagogastric tube courses below  the level of the diaphragm and is coiled within the gastric lumen. No  pleural effusion, pneumothorax, or abnormal area of consolidation.    CURTIS PINTO MD

## 2019-02-22 NOTE — PROCEDURES
Health Maintenance Due   Topic Date Due   • Diabetes Eye Exam  07/03/1975   • Diabetes Foot Exam  07/03/1975   • Shingles Vaccine (1 of 2) 07/03/2007       Patient is due for topics as listed above but is not proceeding with Immunization(s) Shingles at this time. backorder           Interventional Neuroradiology Post Procedure    Patient Name: Fouzia Beltrán  MRN: [id number]  Date of Procedure: August 30, 2017    Procedure: Endovascular mechanical thrombectomy  Radiologist: Juancho Kinney    Contrast: 185 ml Isovue  Fluoro Time: 94.2 minutes  Air Kerma: 6205 mGy  Medications: Versed 7.5 mg IV                       Fentanyl 350 mcg IV  Sedation Time: 235 minutes    EBL: Minimal  Complications: Fluctuating vessel occlusion, see dictation for full detail    Preliminary Report:   (See dictation for full detail)  - TICI0-1 occlusion of the basilar tip, faint filling of the left PCA.  - TICI0 occlusion of the basilar trunk despite numerous attempts with multiple reperfusion catheters and a Solitaire stent retriever.    Assess/Plan:  Sheath removed.  Post procedure brain MRI.  Report to ordering.    Anson Macias MD  [phone]

## 2022-05-02 NOTE — IP AVS SNAPSHOT
MRN:[id number]                      After Visit Summary   8/15/2017    Fouzia Beltrán    MRN: [id number]           Thank you!     Thank you for choosing [facility] for your care. Our goal is always to provide you with excellent care. Hearing back from our patients is one way we can continue to improve our services. Please take a few minutes to complete the written survey that you may receive in the mail after you visit with us. Thank you!        Patient Information     Date Of Birth          1955        Designated Caregiver       Most Recent Value    Caregiver    Will someone help with your care after discharge? yes    Name of designated caregiver Juancho Beltrán, spouse    Phone number of caregiver see chart     Caregiver address see chart      About your hospital stay     You were admitted on:  August 15, 2017 You last received care in the:  [address] [facility]    You were discharged on:  August 16, 2017        Reason for your hospital stay       Stroke                  Who to Call     For medical emergencies, please call 911.  For non-urgent questions about your medical care, please call your primary care provider or clinic, [phone]          Attending Provider     Provider Specialty    Sarah Blank MD Emergency Medicine    Nistor, Breann Leung MD Internal Medicine       Primary Care Provider Office Phone # Fax #    [facility] [phone]      After Care Instructions     Activity       Your activity upon discharge: activity as tolerated            Diet       Follow this diet upon discharge: Orders Placed This Encounter      Room Service      Combination Diet Regular Diet Adult; Thin Liquids (water, ice chips, juice, milk, gelatin, ice cream, etc)                  Follow-up Appointments     Follow-up and recommended labs and tests        Follow up with primary care provider, Rabia Orta MD at [facility]  Patient Education     Back Pain (Acute or Chronic)     Back pain is one of the most common problems. The good news is that most people feel better in 1 to 2 weeks, and most of the rest in 1 to 2 months. Most people can remain active.  People who have pain describe it differently--not everyone is the same.  · The pain can be sharp, stabbing, shooting, aching, cramping or burning.  · Movement, standing, bending, lifting, sitting, or walking may worsen pain.  · It can be limited to one spot or area, or it can be more generalized.  · It can spread upwards, to the front, or go down your arms or legs (sciatica).  · It can cause muscle spasm.  Most of the time, mechanical problems with the muscles or spine cause the pain. Mechanical problems are usually caused by an injury to the muscles or ligaments. Illness can cause back pain, but it's usually not caused by a serious illness. Mechanical problems include:   · Physical activity such as sports, exercise, work, or normal activity  · Overexertion, lifting, pushing, pulling incorrectly or too aggressively  · Sudden twisting, bending, or stretching from an accident, or accidental movement  · Poor posture  · Stretching or moving wrong, without noticing pain at the time  · Poor coordination, lack of regular exercise (check with your doctor about this)  · Spinal disc disease or arthritis  · Stress  Pain can also be related to pregnancy, or illness like appendicitis, bladder or kidney infections, pelvic infections, and many other things.  Acute back pain usually gets better in 1 to 2 weeks. Back pain related to disk disease, arthritis in the spinal joints, or narrowing of the spinal canal (spinal stenosis) can become chronic and last for months or years.  Unless you had a physical injury such as a car accident or fall, X-rays are usually not needed for the first assessment of back pain. If pain continues and does not respond to medical treatment, you may need X-rays and other  Clinic, on Monday August 21st at 2:20 p.m. for hospital follow- up.  You will need to bring $150.00 to that appointment and you will be billed for anything over that amount if needed.  The following labs/tests are recommended: cbc/bmp.    Address: [address] [place] [address]  Phone: [phone]    Follow up with neurologist Dr Huynh in one month.  [facility]  [phone]  [address], [address]  [facility] [address]                  Your next 10 appointments already scheduled     Aug 17, 2017  5:30 AM CDT   Inpatient Meal Follow Up Therapy with Nevin Sesay, SLP   [facility] ([facility])    [address], [address]  [facility] [address] [phone]              Further instructions from your care team       Your risk factors for stroke or TIA (transient ischemic attack):    Your Risk Factors Your Results Normal Ranges   High blood pressure BP Readings from Last 1 Encounters:   08/16/17 (!) 163/92    Less than 120/80   Cholesterol              Total Lab Results   Component Value Date    CHOL 242 08/15/2017      Less than 150    Triglycerides   Lab Results   Component Value Date    TRIG 186 08/15/2017    Less than 150   LDL Lab Results   Component Value Date     08/15/2017       Less than 70   HDL Lab Results   Component Value Date    HDL 78 08/15/2017            Greater than 40 (men)  Greater than 50 (women)   Diabetes   Recent Labs  Lab 08/16/17  0729   *    Fasting blood glucose    Smoking/tobacco use  Quit smoking and tobacco   Overweight  Lose 1-2 pounds a week   Lack of exercise  30 minutes moderate activity each day   Other risk factors include carotid (neck) artery disease, atrial fibrillation and stress. You may be on new medicine to treat high blood pressure, cholesterol, diabetes or atrial fibrillation.    Understanding Stroke Booklet given to patient. Please refer to booklet for  tests.  Home care  Try this home care advice:  · When in bed, try to find a position of comfort. A firm mattress is best. Try lying flat on your back with pillows under your knees. You can also try lying on your side with your knees bent up toward your chest and a pillow between your knees.  · At first, don't try to stretch out the sore spots. If there is a strain, it's not like the good soreness you get after exercising without an injury. In this case, stretching may make it worse.  · Don't sit for long periods, as in a long car ride or during other travel. This puts more stress on the lower back than standing or walking.  · During the first 24 to 72 hours after an acute injury or flare up of chronic back pain, apply an ice pack to the painful area for 20 minutes and then remove it for 20 minutes. Do this over a period of 60 to 90 minutes or several times a day. This will reduce swelling and pain. Wrap the ice pack in a thin towel or plastic to protect your skin.  · You can start with ice, then switch to heat. Heat (hot shower, hot bath, or heating pad) reduces pain and works well for muscle spasms. Heat can be applied to the painful area for 20 minutes then remove it for 20 minutes. Do this over a period of 60 to 90 minutes or several times a day. Don't sleep on a heating pad. It can lead to skin burns or tissue damage.  · You can alternate ice and heat therapy. Talk with your doctor about the best treatment for your back pain.  · Therapeutic massage can help relax the back muscles without stretching them.  · Be aware of safe lifting methods and don't lift anything without stretching first.  Medicines  Talk to your doctor before using medicine, especially if you have other medical problems or are taking other medicines.  · You may use over-the-counter medicine as directed on the bottle to control pain, unless another pain medicine was prescribed. If you have chronic conditions like diabetes, liver or kidney  "further information.    Stroke warning signs and symptoms - CALL 911 right away for:  - Sudden numbness or weakness in the face, arm or leg (often on one side of the body).  - Sudden confusion or trouble understanding what is going on.  - Sudden blurred or decreased vision in one or both eyes.  - Sudden trouble speaking, loss of balance, dizziness or problems with coordination.  - Sudden, severe headache for no reason.  - Fainting or seizures.  - Symptoms may go away then come back suddenly.    Pending Results     No orders found for last 3 day(s).            Statement of Approval     Ordered          17 1357  I have reviewed and agree with all the recommendations and orders detailed in this document.  EFFECTIVE NOW     Approved and electronically signed by:  Gurinder Diaz DO             Admission Information     Date & Time Provider Department Dept. Phone    8/15/2017 Breann James MD [address] [facility] [phone]      Your Vitals Were     Blood Pressure Pulse Temperature Respirations Height Weight    163/92 86 98.9  F (37.2  C) (Oral) 16 1.626 m (5' 4\") 80.6 kg (177 lb 11.1 oz)    Pulse Oximetry BMI (Body Mass Index)                94% 30.5 kg/m2          MyChart Information     [employer] lets you send messages to your doctor, view your test results, renew your prescriptions, schedule appointments and more. To sign up, go to [email or web address] . Click on \"Log in\" on the left side of the screen, which will take you to the Welcome page. Then click on \"Sign up Now\" on the right side of the page.     You will be asked to enter the access code listed below, as well as some personal information. Please follow the directions to create your username and password.     Your access code is: [email or web address]  Expires: 2017  3:43 PM     Your access code will  in 90 days. If you need help or a new code, please call your [facility] clinic or [phone].        Care " disease, stomach ulcers, or gastrointestinal bleeding, or are taking blood thinners, talk to your doctor before taking any medicine.  · Be careful if you are given a prescription medicines, narcotics, or medicine for muscle spasms. They can cause drowsiness, affect your coordination, reflexes, and judgement. Don't drive or operate heavy machinery.  Follow-up care  Follow up with your healthcare provider, or as advised.   If X-rays were taken, you will be told of any new findings that may affect your care  Call 911  Call 911 if any of the following occur:  · Trouble breathing  · Confusion  · Very drowsy or trouble awakening  · Fainting or loss of consciousness  · Rapid or very slow heart rate  · Loss of bowel or bladder control  When to seek medical advice  Call your healthcare provider right away if any of these occur:   · Pain becomes worse or spreads to your legs  · Weakness or numbness in one or both legs  · Numbness in the groin or genital area  [place] last reviewed this educational content on 10/1/2019  © 7455-8363 [employer]. All rights reserved. This information is not intended as a substitute for professional medical care. Always follow your healthcare professional's instructions.            EveryWhere ID     This is your Care EveryWhere ID. This could be used by other organizations to access your [place] medical records  [id number]        Equal Access to Services     RODOLFO MACK : Teresa Greenberg, otoniel cervantes, miguelloraine penajenna walls, zari densonjan florinda lundy laBijalarnav narayan. So [facility] [phone].    ATENCIÓN: Si habla español, tiene a dillard disposición servicios gratuitos de asistencia lingüística. Llame al [phone].    We comply with applicable federal civil rights laws and [place] laws. We do not discriminate on the basis of race, color, national origin, age, disability sex, sexual orientation or gender identity.               Review of your medicines      START taking        Dose / Directions    aspirin 81 MG EC tablet   Used for:  Cerebrovascular accident (CVA), unspecified mechanism (H)   Notes to Patient:  Neurology added this medication to help prevent future strokes        Dose:  81 mg   Take 1 tablet (81 mg) by mouth daily   Quantity:  30 tablet   Refills:  0       lisinopril 2.5 MG tablet   Commonly known as:  PRINIVIL/Zestril   Used for:  Cerebrovascular accident (CVA), unspecified mechanism (H)   Notes to Patient:  This medication is used for hypertension. Take as prescribed. Lowers risk of future strokes when blood pressure well controlled.         Dose:  2.5 mg   Take 1 tablet (2.5 mg) by mouth daily   Quantity:  30 tablet   Refills:  0       simvastatin 20 MG tablet   Commonly known as:  ZOCOR   Used for:  Cerebrovascular accident (CVA), unspecified mechanism (H)   Notes to Patient:  This medication is used to help control your cholesterol levels in the blood. When this is well controlled, it can help lower your risk factors for another stroke.         Dose:  20 mg   Take 1 tablet (20 mg) by mouth At Bedtime   Quantity:  30 tablet   Refills:  0         CONTINUE these medicines which have NOT CHANGED        Dose / Directions    multivitamin, therapeutic with  minerals Tabs tablet        Dose:  1 tablet   Take 1 tablet by mouth daily   Refills:  0            Where to get your medicines      These medications were sent to [facility] [place], [place] - [address]  [address] [place] [address]     Phone:  [phone]     aspirin 81 MG EC tablet    lisinopril 2.5 MG tablet    simvastatin 20 MG tablet                Protect others around you: Learn how to safely use, store and throw away your medicines at www.disposemymeds.org.             Medication List: This is a list of all your medications and when to take them. Check marks below indicate your daily home schedule. Keep this list as a reference.      Medications           Morning Afternoon Evening Bedtime As Needed    aspirin 81 MG EC tablet   Take 1 tablet (81 mg) by mouth daily   Last time this was given:  81 mg on 8/16/2017  8:03 AM   Next Dose Due:  Tomorrow morning 8/17/17   Notes to Patient:  Neurology added this medication to help prevent future strokes                                   lisinopril 2.5 MG tablet   Commonly known as:  PRINIVIL/Zestril   Take 1 tablet (2.5 mg) by mouth daily   Last time this was given:  Not given during this hospitalization   Next Dose Due:  Tonight when you get home x1. Then start taking tomorrow morning as prescribed.    Notes to Patient:  This medication is used for hypertension. Take as prescribed. Lowers risk of future strokes when blood pressure well controlled.                                    multivitamin, therapeutic with minerals Tabs tablet   Take 1 tablet by mouth daily   Last time this was given:  Not given this hospitalization                                   simvastatin 20 MG tablet   Commonly known as:  ZOCOR   Take 1 tablet (20 mg) by mouth At Bedtime   Last time this was given:  Not given during this hospitalization   Next Dose Due:  Tonight 8/16/17   Notes to Patient:  This medication is used to help control your cholesterol  levels in the blood. When this is well controlled, it can help lower your risk factors for another stroke.

## (undated) RX ORDER — PROPOFOL 10 MG/ML
INJECTION, EMULSION INTRAVENOUS
Status: DISPENSED
Start: 2017-01-01